# Patient Record
Sex: FEMALE | Race: BLACK OR AFRICAN AMERICAN | Employment: OTHER | URBAN - METROPOLITAN AREA
[De-identification: names, ages, dates, MRNs, and addresses within clinical notes are randomized per-mention and may not be internally consistent; named-entity substitution may affect disease eponyms.]

---

## 2017-12-04 ENCOUNTER — HOSPITAL ENCOUNTER (OUTPATIENT)
Dept: OTHER | Age: 67
Discharge: HOME | End: 2017-12-04
Payer: MEDICARE

## 2017-12-04 DIAGNOSIS — M79.671 PAIN IN RIGHT FOOT: ICD-10-CM

## 2017-12-04 PROCEDURE — 73630 X-RAY EXAM OF FOOT: CPT

## 2019-04-29 ENCOUNTER — HOSPITAL ENCOUNTER (OUTPATIENT)
Dept: PHYSICAL THERAPY | Age: 69
End: 2019-04-29

## 2019-05-06 ENCOUNTER — HOSPITAL ENCOUNTER (OUTPATIENT)
Dept: PHYSICAL THERAPY | Age: 69
Discharge: HOME | End: 2019-05-06
Payer: MEDICARE

## 2019-05-06 PROCEDURE — 97110 THERAPEUTIC EXERCISES: CPT

## 2019-05-06 PROCEDURE — 97530 THERAPEUTIC ACTIVITIES: CPT

## 2019-05-06 PROCEDURE — 97162 PT EVAL MOD COMPLEX 30 MIN: CPT

## 2019-05-06 NOTE — PROGRESS NOTES
[facility] [place] [address] [address] [facility] [address]  Shlomo stokes, [address]  [phone] fax    Plan of Care/ Statement of Necessity for Physical Therapy Services    Patient name: Jenniffer Tam Start of Care: 2019   Referral source: Yaquelin Arroyo MD : 1950    Medical Diagnosis: Left foot pain [M79.672]  Payor: BLUE CROSS / Plan: [facility] PPO / Product Type: PPO /  Onset Date:2019    Treatment Diagnosis: Left plantar fasciitis   Prior Hospitalization: see medical history Provider#: [id number]   Medications: Verified on Patient summary List    Comorbidities: None   Prior Level of Function: The patient was able to ambulate without difficulty. The Plan of Care and following information is based on the information from the initial evaluation. Assessment/ key information: The patient is a 79year old female with a chief complaint of left foot pain that began to be painful in early January. She indicates that her pain has not improved, and that she feels this first thing in the morning or if she has had a time where she has been immobilized for a period of time. She denies radiographs or cortisone injection and is point tender through her plantar fascia. The patient has signs and symptoms that are consistent with plantar fasciitis with associated impairments consisting of pain, decreased ROM, poor flexibility, decreased first MP extension, decreased ease of ambulation and limited quality of life. The patient will benefit from skilled PT in order to address the aforementioned impairments.     Evaluation Complexity History LOW Complexity : Zero comorbidities / personal factors that will impact the outcome / POC; Examination MEDIUM Complexity : 3 Standardized tests and measures addressing body structure, function, activity limitation and / or participation in recreation  ;Presentation LOW Complexity : Stable, uncomplicated  ;Clinical Decision Making MEDIUM Complexity : FOTO score of 26-74  Overall Complexity Rating: MEDIUM  Problem List: pain affecting function, decrease ROM, decrease strength, decrease ADL/ functional abilitiies, decrease activity tolerance and decrease flexibility/ joint mobility   Treatment Plan may include any combination of the following: Therapeutic exercise, Therapeutic activities, Neuromuscular re-education, Physical agent/modality, Manual therapy, Patient education and Functional mobility training  Patient / Family readiness to learn indicated by: asking questions, trying to perform skills and interest  Persons(s) to be included in education: patient (P)  Barriers to Learning/Limitations: None  Patient Goal (s): No pain  Patient Self Reported Health Status: good  Rehabilitation Potential: good     Short term goals to be accomplished within 2 weeks:  1. The patient will demonstrate independence and compliance with HEP to maximize therapeutic benefit. 2. The patient will improve left ankle DF passively to 15 to improve ease of ambulation. Long term goals to be accomplished within 4 weeks:  1. The patient will improve FOTO score to 58 to maximize quality of life. 2. The patient will improve left DF ankle ROM actively to 12 to improve ease of descending stairs with greater efficiency. 3. The patient will demonstrate left inversion strength of 5/5 MMT to improve efficiency of arch preservation during stance phase and maximize ambulation efficiency. 4. The patient will demonstrate single leg stance stance for 30 seconds to improve stability in stance for return to PLOF. Frequency / Duration: Patient to be seen 2 times per week for 4 weeks.     Medicare Certification: 7/88/3522 - 7/06/2019    Patient/ Caregiver education and instruction: Diagnosis, prognosis, self care, activity modification and exercises   [x]  Plan of care has been reviewed with HALEY Mercado, PT 5/6/2019 5:16 PM    ________________________________________________________________________    I certify that the above Therapy Services are being furnished while the patient is under my care. I agree with the treatment plan and certify that this therapy is necessary.     Physician's Signature:____________Date:_________TIME:________    ** Signature, Date and Time must be completed for valid certification **    Please sign and return to [facility] [address] [place] [address] & [facility]  [address] [place] [address] [address]  [phone] fax

## 2019-05-06 NOTE — PROGRESS NOTES
PT DAILY TREATMENT NOTE 10-18    Patient Name: Christopher Sharma  Date:2019  : 1950  [x]  Patient  Verified  Payor: BLUE CROSS / Plan: [facility] PPO / Product Type: PPO /    In time:4:33  Out time:5:15  Total Treatment Time (min): 42  Visit #: 1 of 8    Medicare/BCBS Only   Total Timed Codes (min):  24 1:1 Treatment Time: 24       Treatment Area: Left foot pain [M79.672]    SUBJECTIVE  Pain Level (0-10 scale): 4/10  Any medication changes, allergies to medications, adverse drug reactions, diagnosis change, or new procedure performed?: [x] No    [] Yes (see summary sheet for update)  Subjective functional status/changes:   [] No changes reported  The patient states that she has a chief complaint of left foot pain limiting her ability to walk and stand after she has been sitting. OBJECTIVE  18 min [x]Eval                  []Re-Eval       14 min Therapeutic Exercise:  [x] See flow sheet :   Rationale: increase ROM and increase strength to improve the patients ability to improve ADL ease. 10 min Therapeutic Activity:  [x]  See flow sheet : explanation of biomechanics of gait with related anatomy as well as stretching implications. Rationale: increase ROM and increase strength  to improve the patients ability to improve ADL ease. With   [] TE   [] TA   [] neuro   [] other: Patient Education: [x] Review HEP    [] Progressed/Changed HEP based on:   [] positioning   [] body mechanics   [] transfers   [] heat/ice application    [] other:      Other Objective/Functional Measures: See IE     Pain Level (0-10 scale) post treatment: 4/10    ASSESSMENT/Changes in Function: See POC.     Patient will continue to benefit from skilled PT services to modify and progress therapeutic interventions, address functional mobility deficits, address ROM deficits, address strength deficits, analyze and address soft tissue restrictions, analyze and cue movement patterns, analyze and modify body mechanics/ergonomics, assess and modify postural abnormalities and instruct in home and community integration to attain remaining goals. [x]  See Plan of Care  []  See progress note/recertification  []  See Discharge Summary         Progress towards goals / Updated goals:  Short term goals to be accomplished within 2 weeks:  1. The patient will demonstrate independence and compliance with HEP to maximize therapeutic benefit. 2. The patient will improve left ankle DF passively to 15 to improve ease of ambulation. Long term goals to be accomplished within 4 weeks:  1. The patient will improve FOTO score to 58 to maximize quality of life. 2. The patient will improve left DF ankle ROM actively to 12 to improve ease of descending stairs with greater efficiency. 3. The patient will demonstrate left inversion strength of 5/5 MMT to improve efficiency of arch preservation during stance phase and maximize ambulation efficiency.   4. The patient will demonstrate single leg stance stance for 30 seconds to improve stability in stance for return to PLOF.      PLAN  []  Upgrade activities as tolerated     [x]  Continue plan of care  []  Update interventions per flow sheet       []  Discharge due to:_  []  Other:_      Bobby Beaver, PT 5/6/2019  5:21 PM    Future Appointments   Date Time Provider Daryl Tanner   5/8/2019 10:00 AM Kathryn Story, PT [facility]   5/14/2019  5:00 PM Yeison Richmond, PTA [facility]   5/21/2019  4:30 PM Verla Stagers, PT [facility]   5/24/2019 12:00 PM Verla Stagers, PT [facility]   5/28/2019  6:00 PM Yeison Richmond, PTA [facility]   5/30/2019  5:00 PM Verla Stagers, PT [facility]   6/3/2019  4:30 PM Yeison Richmond, PTA [facility]

## 2019-05-08 ENCOUNTER — HOSPITAL ENCOUNTER (OUTPATIENT)
Dept: PHYSICAL THERAPY | Age: 69
Discharge: HOME | End: 2019-05-08
Payer: MEDICARE

## 2019-05-08 PROCEDURE — 97110 THERAPEUTIC EXERCISES: CPT

## 2019-05-08 NOTE — PROGRESS NOTES
PT DAILY TREATMENT NOTE 10-18    Patient Name: Kanwal Mcwilliams  Date:2019  : 1950  [x]  Patient  Verified  Payor: BLUE CROSS / Plan: [facility] PPO / Product Type: PPO /    In time:10:03  Out time:10:35  Total Treatment Time (min): 32  Visit #: 2 of 8    Medicare/BCBS Only   Total Timed Codes (min):  32 1:1 Treatment Time:  12       Treatment Area: Left foot pain [M79.672]    SUBJECTIVE  Pain Level (0-10 scale): 10  Any medication changes, allergies to medications, adverse drug reactions, diagnosis change, or new procedure performed?: [x] No    [] Yes (see summary sheet for update)  Subjective functional status/changes:   [] No changes reported  The patient states she has been performing her HEP and her pain levels are less today. She states she feels like it is helping. OBJECTIVE  24 min Therapeutic Exercise:  [] See flow sheet :   Rationale: increase ROM and increase strength to improve the patients ability to improve ADL ease. 8 min Manual Therapy:  Graston Technique Treatment Intervention:    Patient positioning: prone    Treatment location: left gastroc/soleus/achilles/plantar fascia    Graston Technique Instruments utilized: GT4, GT5    Explained effects and benefits of Graston Technique, including potential for post treatment soreness and bruising. Patient was provided the opportunity to ask any questions and verbalized understanding. Patient wished to proceed with treatment. Rationale: decrease pain, increase ROM and increase tissue extensibility to improve ADL ease. With   [] TE   [] TA   [] neuro   [] other: Patient Education: [x] Review HEP    [] Progressed/Changed HEP based on:   [] positioning   [] body mechanics   [] transfers   [] heat/ice application    [] other:      Other Objective/Functional Measures:   First follow-up.      Pain Level (0-10 scale) post treatment: 1/10    ASSESSMENT/Changes in Function: Good pain control following first session. HEP compliance reported. Patient will continue to benefit from skilled PT services to modify and progress therapeutic interventions, address functional mobility deficits, address ROM deficits, address strength deficits, analyze and address soft tissue restrictions, analyze and cue movement patterns, analyze and modify body mechanics/ergonomics, assess and modify postural abnormalities and instruct in home and community integration to attain remaining goals. []  See Plan of Care  []  See progress note/recertification  []  See Discharge Summary         Progress towards goals / Updated goals:  Short term goals to be accomplished within 2 weeks:  1. The patient will demonstrate independence and compliance with HEP to maximize therapeutic benefit. Met 5/08/2019  2. The patient will improve left ankle DF passively to 15 to improve ease of ambulation. Long term goals to be accomplished within 4 weeks:  1. The patient will improve FOTO score to 58 to maximize quality of life. 2. The patient will improve left DF ankle ROM actively to 12 to improve ease of descending stairs with greater efficiency. 3. The patient will demonstrate left inversion strength of 5/5 MMT to improve efficiency of arch preservation during stance phase and maximize ambulation efficiency.   4. The patient will demonstrate single leg stance stance     PLAN  [x]  Upgrade activities as tolerated     [x]  Continue plan of care  []  Update interventions per flow sheet       []  Discharge due to:_  []  Other:_      Tashi Cornejo, PT 5/8/2019  12:50 PM    Future Appointments   Date Time Provider Daryl Tanner   5/14/2019  5:00 PM [facility]   5/21/2019  4:30 PM Lesly Christie, PT [facility]   5/24/2019 12:00 PM [facility]ly Christie [facility]   5/28/2019  6:00 PM Mak Garcia [facility]   5/30/2019  5:00 PM [facility]ly Christie [facility]   6/3/2019  4:30 PM Mak Garcia C, PTA [facility]

## 2019-05-14 ENCOUNTER — HOSPITAL ENCOUNTER (OUTPATIENT)
Dept: PHYSICAL THERAPY | Age: 69
End: 2019-05-14
Payer: MEDICARE

## 2019-05-21 ENCOUNTER — HOSPITAL ENCOUNTER (OUTPATIENT)
Dept: PHYSICAL THERAPY | Age: 69
Discharge: HOME | End: 2019-05-21
Payer: MEDICARE

## 2019-05-21 PROCEDURE — 97110 THERAPEUTIC EXERCISES: CPT

## 2019-05-21 PROCEDURE — 97140 MANUAL THERAPY 1/> REGIONS: CPT

## 2019-05-21 NOTE — PROGRESS NOTES
PT DAILY TREATMENT NOTE 10-18    Patient Name: Heydi Rothman  Date:2019  : 1950  [x]  Patient  Verified  Payor: [place] MED[place] / Plan: [place] MEDICARE PART A & B / Product Type: Medicare /    In time:4:40  Out time:5:15  Total Treatment Time (min): 35  Visit #: 3 of 8    Medicare/BCBS Only   Total Timed Codes (min):  35 1:1 Treatment Time:  35       Treatment Area: Left foot pain [M79.672]    SUBJECTIVE  Pain Level (0-10 scale): 6/10  Any medication changes, allergies to medications, adverse drug reactions, diagnosis change, or new procedure performed?: [x] No    [] Yes (see summary sheet for update)  Subjective functional status/changes:   [] No changes reported  The patient states that she has been compliant with her HEP, but her pain has increased today from standing all day. OBJECTIVE  25 min Therapeutic Exercise:  [x] See flow sheet :   Rationale: increase ROM and increase strength to improve the patients ability to improve ADL ease. 10 min Manual Therapy:  Graston Technique Treatment Intervention:     Patient positioning: prone     Treatment location: left gastroc/soleus/achilles/plantar fascia     Graston Technique Instruments utilized: GT4, GT5     Explained effects and benefits of Graston Technique, including potential for post treatment soreness and bruising. Patient was provided the opportunity to ask any questions and verbalized understanding. Patient wished to proceed with treatment. Rationale: decrease pain, increase ROM and increase tissue extensibility to improve ADL ease. With   [] TE   [] TA   [] neuro   [] other: Patient Education: [x] Review HEP    [] Progressed/Changed HEP based on:   [] positioning   [] body mechanics   [] transfers   [] heat/ice application    [] other:      Other Objective/Functional Measures:   HEP met  DF 12 degrees     Pain Level (0-10 scale) post treatment: 3/10    ASSESSMENT/Changes in Function: The patient reports compliance with HEP.  Her DF improved to 12 degrees. Patient will continue to benefit from skilled PT services to modify and progress therapeutic interventions, address functional mobility deficits, address ROM deficits, address strength deficits, analyze and address soft tissue restrictions, analyze and cue movement patterns, analyze and modify body mechanics/ergonomics, assess and modify postural abnormalities and instruct in home and community integration to attain remaining goals. []  See Plan of Care  []  See progress note/recertification  []  See Discharge Summary         Progress towards goals / Updated goals:  Short term goals to be accomplished within 2 weeks:  1. The patient will demonstrate independence and compliance with HEP to maximize therapeutic benefit. Met 5/21/2019  2. The patient will improve left ankle DF passively to 15 to improve ease of ambulation. Met - 12 degrees 5/21/2019  Long term goals to be accomplished within 4 weeks:  1. The patient will improve FOTO score to 58 to maximize quality of life. 2. The patient will improve left DF ankle ROM actively to 12 to improve ease of descending stairs with greater efficiency. 3. The patient will demonstrate left inversion strength of 5/5 MMT to improve efficiency of arch preservation during stance phase and maximize ambulation efficiency. 4. The patient will demonstrate single leg stance stance for 30 seconds to improve stability in stance for return to PLOF.     PLAN  []  Upgrade activities as tolerated     [x]  Continue plan of care  []  Update interventions per flow sheet       []  Discharge due to:_  []  Other:_      Verona Colon, PT 5/21/2019  5:01 PM    Future Appointments   Date Time Provider Daryl Tanner   5/28/2019  6:00 PM Fiona Figueredo [facility]   5/30/2019  5:00 PM Jason Gruber, PT [facility]   6/3/2019  4:30 PM Arash Villatoro PTA [facility]

## 2019-05-24 ENCOUNTER — APPOINTMENT (OUTPATIENT)
Dept: PHYSICAL THERAPY | Age: 69
End: 2019-05-24
Payer: MEDICARE

## 2019-05-28 ENCOUNTER — HOSPITAL ENCOUNTER (OUTPATIENT)
Dept: PHYSICAL THERAPY | Age: 69
Discharge: HOME | End: 2019-05-28
Payer: MEDICARE

## 2019-05-28 PROCEDURE — 97140 MANUAL THERAPY 1/> REGIONS: CPT

## 2019-05-28 PROCEDURE — 97110 THERAPEUTIC EXERCISES: CPT

## 2019-05-28 NOTE — PROGRESS NOTES
PT DAILY TREATMENT NOTE 10-18    Patient Name: Hayden Parham  Date:2019  : 1950  [x]  Patient  Verified  Payor: [place] MEDICARE / Plan: [place] MEDICARE PART A & B / Product Type: Medicare /    In time:6:03  Out time:6:34  Total Treatment Time (min): 31  Visit #: 4 of 8    Medicare/BCBS Only   Total Timed Codes (min):  31 1:1 Treatment Time:  31     Treatment Area: Left foot pain [M79.672]     SUBJECTIVE  Pain Level (0-10 scale): 4/10  Any medication changes, allergies to medications, adverse drug reactions, diagnosis change, or new procedure performed?: [x] No    [] Yes (see summary sheet for update)  Subjective functional status/changes:   [] No changes reported  \"I bought some comfortable shoes so my foot isn't that bad right now. \"    OBJECTIVE    23 min Therapeutic Exercise:  [x] See flow sheet :   Rationale: increase ROM, increase strength and increase proprioception to improve the patients ability to perform ADL's.    8 min Manual Therapy:  Graston technique to Left gastroc, soleus, achilles tendon and plantar fascia. Rationale: decrease pain, increase ROM, increase tissue extensibility and decrease trigger points to improve ease of performing functional activities. With   [x] TE   [] TA   [] neuro   [] other: Patient Education: [x] Review HEP    [] Progressed/Changed HEP based on:   [] positioning   [] body mechanics   [] transfers   [] heat/ice application    [] other:      Other Objective/Functional Measures: Increased reps for several exercises. Pain Level (0-10 scale) post treatment: 0/10    ASSESSMENT/Changes in Function: Pt denied pain post-treatment. Extensive education on how to properly return to recreational activities. Pt puts forth good effort, plan to continue PT to increase ankle DF and ankle strength to improve ease of performing functional and recreational activities.     Patient will continue to benefit from skilled PT services to modify and progress therapeutic interventions, address functional mobility deficits, address ROM deficits, address strength deficits, analyze and address soft tissue restrictions and analyze and cue movement patterns to attain remaining goals. [x]  See Plan of Care  []  See progress note/recertification  []  See Discharge Summary         Progress towards goals / Updated goals:  Short term goals to be accomplished within 2 weeks:  1. The patient will demonstrate independence and compliance with HEP to maximize therapeutic benefit. Met 5/21/2019  2. The patient will improve left ankle DF passively to 15 to improve ease of ambulation. Met - 12 degrees 5/21/2019  Long term goals to be accomplished within 4 weeks:  1. The patient will improve FOTO score to 58 to maximize quality of life. 2. The patient will improve left DF ankle ROM actively to 12 to improve ease of descending stairs with greater efficiency. 3. The patient will demonstrate left inversion strength of 5/5 MMT to improve efficiency of arch preservation during stance phase and maximize ambulation efficiency. 4. The patient will demonstrate single leg stance stance for 30 seconds to improve stability in stance for return to PLOF.      PLAN  []  Upgrade activities as tolerated     [x]  Continue plan of care  []  Update interventions per flow sheet       []  Discharge due to:_  []  Other:_      Mario England, HALEY 5/28/2019  6:02 PM    Future Appointments   Date Time Provider Daryl Tanner   5/30/2019  5:00 PM [facility]   6/3/2019  4:30 PM Parminder Villatoro, [facility]

## 2019-05-30 ENCOUNTER — HOSPITAL ENCOUNTER (OUTPATIENT)
Dept: PHYSICAL THERAPY | Age: 69
Discharge: HOME | End: 2019-05-30
Payer: MEDICARE

## 2019-05-30 PROCEDURE — 97110 THERAPEUTIC EXERCISES: CPT

## 2019-05-30 PROCEDURE — 97140 MANUAL THERAPY 1/> REGIONS: CPT

## 2019-05-30 NOTE — PROGRESS NOTES
PT DAILY TREATMENT NOTE 10-18    Patient Name: Colette Reason  Date:2019  : 1950  [x]  Patient  Verified  Payor: [place] MEDICARE / Plan: [place] MEDICARE PART A & B / Product Type: Medicare /    In time:5:52  Out time:6:35  Total Treatment Time (min): 43  Visit #: 5 of 8    Medicare/BCBS Only   Total Timed Codes (min):  43 1:1 Treatment Time:  43       Treatment Area: Left foot pain [M79.672]    SUBJECTIVE  Pain Level (0-10 scale): 2/10  Any medication changes, allergies to medications, adverse drug reactions, diagnosis change, or new procedure performed?: [x] No    [] Yes (see summary sheet for update)  Subjective functional status/changes:   [] No changes reported  The patient states that her foot feels about 50% better since [facility]. OBJECTIVE  33 min Therapeutic Exercise:  [x] See flow sheet :   Rationale: increase ROM and increase strength to improve the patients ability to improve ADL ease. 10 min Manual Therapy:  Graston Technique Treatment Intervention:    Patient positioning: prone    Treatment location: left gastroc/soleus/achilles/plantar fascia    Graston Technique Instruments utilized: GT4, GT5    Explained effects and benefits of Graston Technique, including potential for post treatment soreness and bruising. Patient was provided the opportunity to ask any questions and verbalized understanding. Patient wished to proceed with treatment. Rationale: decrease pain, increase ROM and increase tissue extensibility to improve ADL ease. With   [] TE   [] TA   [] neuro   [] other: Patient Education: [x] Review HEP    [] Progressed/Changed HEP based on:   [] positioning   [] body mechanics   [] transfers   [] heat/ice application    [] other:      Other Objective/Functional Measures:   MP extension: 51 degrees  DF: 13 degrees    Pain Level (0-10 scale) post treatment: 0/10    ASSESSMENT/Changes in Function: The patient is progressing with both DF and first ray MP extension.  She reports 50% improvement since [facility]. She left the clinic with no pain and in no apparent distress. Patient will continue to benefit from skilled PT services to modify and progress therapeutic interventions, address functional mobility deficits, address ROM deficits, address strength deficits, analyze and address soft tissue restrictions, analyze and cue movement patterns, analyze and modify body mechanics/ergonomics, assess and modify postural abnormalities and instruct in home and community integration to attain remaining goals. []  See Plan of Care  []  See progress note/recertification  []  See Discharge Summary         Progress towards goals / Updated goals:  Short term goals to be accomplished within 2 weeks:  1. The patient will demonstrate independence and compliance with HEP to maximize therapeutic benefit. Met 5/21/2019  2. The patient will improve left ankle DF passively to 15 to improve ease of ambulation. Met - 12 degrees 5/21/2019  Long term goals to be accomplished within 4 weeks:  1. The patient will improve FOTO score to 58 to maximize quality of life. 2. The patient will improve left DF ankle ROM actively to 12 to improve ease of descending stairs with greater efficiency. Met - 13 degrees 5/30/2019  3. The patient will demonstrate left inversion strength of 5/5 MMT to improve efficiency of arch preservation during stance phase and maximize ambulation efficiency. 4. The patient will demonstrate single leg stance stance for 30 seconds to improve stability in stance for return to PLOF.        PLAN  []  Upgrade activities as tolerated     [x]  Continue plan of care  []  Update interventions per flow sheet       []  Discharge due to:_  []  Other:_      Cat Salazar, PT 5/30/2019  6:51 PM    Future Appointments   Date Time Provider Daryl Tanner   6/3/2019  4:30 PM Aminah Anton, PTA [facility]

## 2019-06-03 ENCOUNTER — APPOINTMENT (OUTPATIENT)
Dept: PHYSICAL THERAPY | Age: 69
End: 2019-06-03
Payer: MEDICARE

## 2019-06-18 ENCOUNTER — APPOINTMENT (OUTPATIENT)
Dept: PHYSICAL THERAPY | Age: 69
End: 2019-06-18
Payer: MEDICARE

## 2019-06-24 ENCOUNTER — HOSPITAL ENCOUNTER (OUTPATIENT)
Dept: PHYSICAL THERAPY | Age: 69
Discharge: HOME | End: 2019-06-24
Payer: MEDICARE

## 2019-06-24 PROCEDURE — 97140 MANUAL THERAPY 1/> REGIONS: CPT

## 2019-06-24 PROCEDURE — 97110 THERAPEUTIC EXERCISES: CPT

## 2019-06-24 NOTE — PROGRESS NOTES
PT DAILY TREATMENT NOTE 10-18    Patient Name: Fern Claude  Date:2019  : 1950  [x]  Patient  Verified  Payor: BLUE CROSS / Plan: [facility] PPO / Product Type: PPO /    In time:9:30  Out time:10:10  Total Treatment Time (min): 40  Visit #: 6 of 8    Medicare/BCBS Only   Total Timed Codes (min):  40 1:1 Treatment Time:  40       Treatment Area: Left foot pain [M79.672]    SUBJECTIVE  Pain Level (0-10 scale): 8/10  Any medication changes, allergies to medications, adverse drug reactions, diagnosis change, or new procedure performed?: [x] No    [] Yes (see summary sheet for update)  Subjective functional status/changes:   [] No changes reported  \"I don't know why it's not healed yet. I need a Doctors note because I see Doctor on Wednesday. \"    OBJECTIVE    32 min Therapeutic Exercise:  [x] See flow sheet :   Rationale: increase ROM and increase strength to improve the patients ability to perform ADL's.    8 min Manual Therapy:  Graston technique to Left gastroc, soleus, achilles tendon and plantar fascia. Gastroc and soleus stretch. Rationale: decrease pain, increase ROM, increase tissue extensibility and decrease trigger points to improve standing/walking tolerance. With   [x] TE   [] TA   [] neuro   [] other: Patient Education: [x] Review HEP    [] Progressed/Changed HEP based on:   [] positioning   [] body mechanics   [] transfers   [] heat/ice application    [] other:      Other Objective/Functional Measures: FOTO 32%. MMT Left Inversion 4+/5. Left SLS 5\". Pt reports continued HEP compliance. Over 3 weeks passed since previous visit. Recommend continue PT for additional 2x a week for 3-4 weeks.     Pain Level (0-10 scale) post treatment: 0/10    ASSESSMENT/Changes in Function:      []  See Plan of Care  [x]  See progress note/recertification  []  See Discharge Summary         Progress towards goals / Updated goals:  Short term goals to be accomplished within 2 weeks: 1. The patient will demonstrate independence and compliance with HEP to maximize therapeutic benefit. Met 5/21/2019   2. The patient will improve left ankle DF passively to 15 to improve ease of ambulation. Met - 12 degrees 5/21/2019  Long term goals to be accomplished within 4 weeks:  1. The patient will improve FOTO score to 58 to maximize quality of life. - FOTO 32%. 6/24/2019  2. The patient will improve left DF ankle ROM actively to 12 to improve ease of descending stairs with greater efficiency. Met - 13 degrees 5/30/2019  3. The patient will demonstrate left inversion strength of 5/5 MMT to improve efficiency of arch preservation during stance phase and maximize ambulation efficiency. - MMT Left Inversion 4+/5. 6/24/2019  4.  The patient will demonstrate single leg stance stance for 30 seconds to improve stability in stance for return to PLOF.  - Left SLS 5\". 6/24/2019    PLAN  []  Upgrade activities as tolerated     [x]  Continue plan of care  []  Update interventions per flow sheet       []  Discharge due to:_  []  Other:_      Lyn Nunez PTA 6/24/2019  9:31 AM    Future Appointments   Date Time Provider Daryl Tanner   6/28/2019  2:00 PM Geno Blank, PT [facility]

## 2019-07-02 ENCOUNTER — HOSPITAL ENCOUNTER (OUTPATIENT)
Dept: PHYSICAL THERAPY | Age: 69
Discharge: HOME | End: 2019-07-02
Payer: MEDICARE

## 2019-07-02 PROCEDURE — 97110 THERAPEUTIC EXERCISES: CPT

## 2019-07-02 PROCEDURE — 97140 MANUAL THERAPY 1/> REGIONS: CPT

## 2019-07-02 NOTE — PROGRESS NOTES
PT DAILY TREATMENT NOTE 10-18    Patient Name: Juanjo Jett  Date:2019  : 1950  [x]  Patient  Verified  Payor: [place] MEDICARE / Plan: [place] MEDICARE PART A & B / Product Type: Medicare /    In time:12:02  Out time:12:33  Total Treatment Time (min): 31  Visit #: 7 of 8    Medicare/BCBS Only   Total Timed Codes (min):  31 1:1 Treatment Time:  31       Treatment Area: Left foot pain [M79.672]    SUBJECTIVE  Pain Level (0-10 scale): 2/10  Any medication changes, allergies to medications, adverse drug reactions, diagnosis change, or new procedure performed?: [x] No    [] Yes (see summary sheet for update)  Subjective functional status/changes:   [] No changes reported  The patient states that her foot has been feeling better. OBJECTIVE  23 min Therapeutic Exercise:  [] See flow sheet :   Rationale: increase ROM and increase strength to improve the patients ability to improve ADL ease. 8 min Manual Therapy:  Graston Technique Treatment Intervention:    Patient positioning: prone    Treatment location: right gastroc/soleus/plantar fascia    Graston Technique Instruments utilized: GT-5, GT-4    Explained effects and benefits of Graston Technique, including potential for post treatment soreness and bruising. Patient was provided the opportunity to ask any questions and verbalized understanding. Patient wished to proceed with treatment. Rationale: decrease pain, increase ROM and increase tissue extensibility to improve ADL ease. With   [] TE   [] TA   [] neuro   [] other: Patient Education: [x] Review HEP    [] Progressed/Changed HEP based on:   [] positioning   [] body mechanics   [] transfers   [] heat/ice application    [] other:      Other Objective/Functional Measures:   DF ROM continues to be limited grossly. Decreased pain through plantar fascia noted during graston. Medial gastroc head discomfort.     Pain Level (0-10 scale) post treatment: 0/10    ASSESSMENT/Changes in Function: Decrease in pain from last session appreciated today with good pain control post session. Continue POC with emphasis of maximize intrinsic foot strength and talocrural ROM. Patient will continue to benefit from skilled PT services to modify and progress therapeutic interventions, address functional mobility deficits, address ROM deficits, address strength deficits, analyze and address soft tissue restrictions, analyze and cue movement patterns, analyze and modify body mechanics/ergonomics, assess and modify postural abnormalities and instruct in home and community integration to attain remaining goals. []  See Plan of Care  []  See progress note/recertification  []  See Discharge Summary         Progress towards goals / Updated goals:  1. The patient will improve FOTO score to 58 to maximize quality of life. - FOTO 32%. 6/24/2019  2. The patient will improve left DF ankle ROM actively to 15 to improve ease of descending stairs with greater efficiency.   3. The patient will demonstrate left inversion strength of 5/5 MMT to improve efficiency of arch preservation during stance phase and maximize ambulation efficiency. - MMT Left Inversion 4+/5. 6/24/2019  4.  The patient will demonstrate single leg stance stance for 30 seconds to improve stability in stance for return to PLOF.  - Left SLS 5\". 6/24/2019    PLAN  []  Upgrade activities as tolerated     [x]  Continue plan of care  []  Update interventions per flow sheet       []  Discharge due to:_  []  Other:_      Harshal Adams, PT 7/2/2019  3:35 PM    Future Appointments   Date Time Provider Daryl Tanner   7/9/2019 10:00 AM Christian Peace PTA [facility]   7/11/2019 10:00 AM Christian Peace PTA [facility]   7/15/2019 10:00 AM Sherwin uTttle PT [facility]   7/23/2019  9:30 AM [facility]   7/25/2019 10:00 AM [facility] Peace [facility]   7/30/2019 10:00 AM Macario Story, PT [facility]

## 2019-07-09 ENCOUNTER — HOSPITAL ENCOUNTER (OUTPATIENT)
Dept: PHYSICAL THERAPY | Age: 69
Discharge: HOME | End: 2019-07-09
Payer: MEDICARE

## 2019-07-09 PROCEDURE — 97140 MANUAL THERAPY 1/> REGIONS: CPT

## 2019-07-09 PROCEDURE — 97110 THERAPEUTIC EXERCISES: CPT

## 2019-07-09 NOTE — PROGRESS NOTES
PT DAILY TREATMENT NOTE 10-18    Patient Name: Joana Blanchard  Date:2019  : 1950  [x]  Patient  Verified  Payor: VA MEDICARE / Plan: VA MEDICARE PART A & B / Product Type: Medicare /    In time:10:09  Out time:10:35  Total Treatment Time (min): 26  Visit #: 2 of 8    Medicare/BCBS Only   Total Timed Codes (min):  26 1:1 Treatment Time:  26       Treatment Area: Left foot pain [M79.672]    SUBJECTIVE  Pain Level (0-10 scale): 0/10  Any medication changes, allergies to medications, adverse drug reactions, diagnosis change, or new procedure performed?: [x] No    [] Yes (see summary sheet for update)  Subjective functional status/changes:   [] No changes reported  Pt late for appointment. OBJECTIVE    18 min Therapeutic Exercise:  [x] See flow sheet :   Rationale: increase ROM, increase strength and increase proprioception to improve the patients ability to perform ADL's.    8 min Manual Therapy:  Graston technique to Left grastroc, soleus, achilles tendon and plantar fascia. Rationale: decrease pain, increase ROM and increase tissue extensibility to improve ease of performing functional activities. With   [x] TE   [] TA   [] neuro   [] other: Patient Education: [x] Review HEP    [] Progressed/Changed HEP based on:   [] positioning   [] body mechanics   [] transfers   [] heat/ice application    [] other:      Other Objective/Functional Measures: Added eccentric 2\" step downs 15 reps. Increased bridges to 15 reps. Pain Level (0-10 scale) post treatment: 0/10    ASSESSMENT/Changes in Function: Demonstrates improved ankle control with short foot sit to stands. Continue PT to increase ankle strength and intrinsic mm stability to improve ease of performing functional activities.     Patient will continue to benefit from skilled PT services to modify and progress therapeutic interventions, address functional mobility deficits, address ROM deficits, address strength deficits and analyze and address soft tissue restrictions to attain remaining goals. [x]  See Plan of Care  []  See progress note/recertification  []  See Discharge Summary         Progress towards goals / Updated goals:  1. The patient will improve FOTO score to 58 to maximize quality of life. - FOTO 32%. 6/24/2019  2. The patient will improve left DF ankle ROM actively to 15 to improve ease of descending stairs with greater efficiency.   3. The patient will demonstrate left inversion strength of 5/5 MMT to improve efficiency of arch preservation during stance phase and maximize ambulation efficiency. - MMT Left Inversion 4+/5. 6/24/2019  4.  The patient will demonstrate single leg stance stance for 30 seconds to improve stability in stance for return to PLOF.  - Left SLS 5\". 6/24/2019    PLAN  []  Upgrade activities as tolerated     [x]  Continue plan of care  []  Update interventions per flow sheet       []  Discharge due to:_  []  Other:_      Yanelis Morel PTA 7/9/2019  10:12 AM    Future Appointments   Date Time Provider Daryl Tanner   7/11/2019 10:00 AM Angeline Robledo PTA [facility]   7/15/2019 10:00 AM [facility]   7/23/2019  9:30 AM [facility]   7/25/2019 10:00 AM Angeline [facility]led[facility]   7/30/2019 10:00 AM [facility] Story[facility]

## 2019-07-11 ENCOUNTER — HOSPITAL ENCOUNTER (OUTPATIENT)
Dept: PHYSICAL THERAPY | Age: 69
Discharge: HOME | End: 2019-07-11
Payer: MEDICARE

## 2019-07-11 PROCEDURE — 97110 THERAPEUTIC EXERCISES: CPT

## 2019-07-11 PROCEDURE — 97140 MANUAL THERAPY 1/> REGIONS: CPT

## 2019-07-11 NOTE — PROGRESS NOTES
PT DAILY TREATMENT NOTE 10-18    Patient Name: Marquis Garcia  Date:2019  : 1950  [x]  Patient  Verified  Payor: VA MEDICARE / Plan: VA MEDICARE PART A & B / Product Type: Medicare /    In time:10:07  Out time:10:37  Total Treatment Time (min): 30  Visit #: 3 of 8    Medicare/BCBS Only   Total Timed Codes (min):  30 1:1 Treatment Time:  30       Treatment Area: Left foot pain [M79.672]    SUBJECTIVE  Pain Level (0-10 scale): 1-10  Any medication changes, allergies to medications, adverse drug reactions, diagnosis change, or new procedure performed?: [x] No    [] Yes (see summary sheet for update)  Subjective functional status/changes:   [] No changes reported  \"I've been resting it, just a little pain in the heel. \"    OBJECTIVE    22 min Therapeutic Exercise:  [x] See flow sheet :   Rationale: increase ROM, increase strength and increase proprioception to improve the patients ability to perform functional activities. 8 min Manual Therapy:  Graston technique to Left gastroc, soleus, achilles tendon and plantar fascia. Rationale: decrease pain, increase ROM, increase tissue extensibility and decrease trigger points to improve walking/standing tolerance. With   [x] TE   [] TA   [] neuro   [] other: Patient Education: [x] Review HEP    [] Progressed/Changed HEP based on:   [] positioning   [] body mechanics   [] transfers   [] heat/ice application    [] other:      Other Objective/Functional Measures: AROM Left ankle DF 9 degrees. Pain Level (0-10 scale) post treatment: 2/10    ASSESSMENT/Changes in Function: Ankle DF improved by 1 degrees. Pt making steady progress towards goals. Continue PT to increase ankle DF AROM and improved walking/standing tolerance.     Patient will continue to benefit from skilled PT services to modify and progress therapeutic interventions, address functional mobility deficits, address ROM deficits, address strength deficits and analyze and address soft tissue restrictions to attain remaining goals. [x]  See Plan of Care  []  See progress note/recertification  []  See Discharge Summary         Progress towards goals / Updated goals:  1. The patient will improve FOTO score to 58 to maximize quality of life. - FOTO 32%. 6/24/2019  2. The patient will improve left DF ankle ROM actively to 15 to improve ease of descending stairs with greater efficiency. - AROM Left ankle DF 9 degrees. 7/11/2019  3. The patient will demonstrate left inversion strength of 5/5 MMT to improve efficiency of arch preservation during stance phase and maximize ambulation efficiency. - MMT Left Inversion 4+/5. 6/24/2019  4.  The patient will demonstrate single leg stance stance for 30 seconds to improve stability in stance for return to PLOF.  - Left SLS 5\". 6/24/2019    PLAN  []  Upgrade activities as tolerated     [x]  Continue plan of care  []  Update interventions per flow sheet       []  Discharge due to:_  []  Other:_      Michelle Magana PTA 7/11/2019  10:09 AM    Future Appointments   Date Time Provider Daryl Tanner   7/15/2019 10:00 AM Compton, Margaree Cooks, PT [facility]   7/23/2019  9:30 AM [facility]   7/25/2019 10:00 AM [facility]   7/30/2019 10:00 AM Compton[facility] Cooks[facility]

## 2019-07-15 ENCOUNTER — HOSPITAL ENCOUNTER (OUTPATIENT)
Dept: PHYSICAL THERAPY | Age: 69
Discharge: HOME | End: 2019-07-15
Payer: MEDICARE

## 2019-07-15 PROCEDURE — 97140 MANUAL THERAPY 1/> REGIONS: CPT

## 2019-07-15 PROCEDURE — 97110 THERAPEUTIC EXERCISES: CPT

## 2019-07-15 NOTE — PROGRESS NOTES
PT DAILY TREATMENT NOTE 10-18    Patient Name: Rafa Al  Date:7/15/2019  : 1950  [x]  Patient  Verified  Payor: [place] MEDICARE / Plan: VA MEDICARE PART A & B / Product Type: Medicare /    In time:9:59  Out time:10:36  Total Treatment Time (min): 37  Visit #: 4 of 8    Medicare/BCBS Only   Total Timed Codes (min):  37 1:1 Treatment Time:  31       Treatment Area: Left foot pain [M79.672]    SUBJECTIVE  Pain Level (0-10 scale): 10  Any medication changes, allergies to medications, adverse drug reactions, diagnosis change, or new procedure performed?: [x] No    [] Yes (see summary sheet for update)  Subjective functional status/changes:   [] No changes reported  The patient states that her foot is sore from being on it car shopping, but overall doing better, and feeling a ton better since onset date. OBJECTIVE  27 min Therapeutic Exercise:  [x] See flow sheet :   Rationale: increase ROM and increase strength to improve the patients ability to improve ADL ease. 10 min Neuromuscular Re-education:  [x]  See flow sheet :   Rationale: increase ROM and increase strength  to improve the patients ability to improve ADL ease. With   [] TE   [] TA   [] neuro   [] other: Patient Education: [x] Review HEP    [] Progressed/Changed HEP based on:   [] positioning   [] body mechanics   [] transfers   [] heat/ice application    [] other:      Other Objective/Functional Measures: The patient is ambulating with improved ease, noting decreased pain with ambulation. Pain Level (0-10 scale) post treatment: 1/10    ASSESSMENT/Changes in Function: The patient has progressed well with regards to ambulation ease. No increase in pain post session.      Patient will continue to benefit from skilled PT services to modify and progress therapeutic interventions, address functional mobility deficits, address ROM deficits, address strength deficits, analyze and address soft tissue restrictions, analyze and cue movement patterns, analyze and modify body mechanics/ergonomics, assess and modify postural abnormalities and instruct in home and community integration to attain remaining goals. []  See Plan of Care  []  See progress note/recertification  []  See Discharge Summary         Progress towards goals / Updated goals:  1. The patient will improve FOTO score to 58 to maximize quality of life. - FOTO 32%. 6/24/2019  2. The patient will improve left DF ankle ROM actively to 15 to improve ease of descending stairs with greater efficiency. - AROM Left ankle DF 9 degrees. 7/11/2019  3. The patient will demonstrate left inversion strength of 5/5 MMT to improve efficiency of arch preservation during stance phase and maximize ambulation efficiency. - MMT Left Inversion 4+/5. 6/24/2019  4.  The patient will demonstrate single leg stance stance for 30 seconds to improve stability in stance for return to PLOF.  - Left SLS 5\" - 6/24/2019     PLAN  []  Upgrade activities as tolerated     [x]  Continue plan of care  []  Update interventions per flow sheet       []  Discharge due to:_  []  Other:_      Yeison Ramos, PT 7/15/2019  1:03 PM    Future Appointments   Date Time Provider Daryl Tanner   7/23/2019  9:30 AM Linda Rogers PTA [facility]   7/25/2019 10:00 AM [facility]   7/30/2019 10:00 AM Donta Story, PT [facility]

## 2019-07-23 ENCOUNTER — APPOINTMENT (OUTPATIENT)
Dept: PHYSICAL THERAPY | Age: 69
End: 2019-07-23
Payer: MEDICARE

## 2019-07-24 ENCOUNTER — HOSPITAL ENCOUNTER (OUTPATIENT)
Dept: PHYSICAL THERAPY | Age: 69
Discharge: HOME | End: 2019-07-24
Payer: MEDICARE

## 2019-07-24 PROCEDURE — 97140 MANUAL THERAPY 1/> REGIONS: CPT

## 2019-07-24 PROCEDURE — 97110 THERAPEUTIC EXERCISES: CPT

## 2019-07-24 NOTE — PROGRESS NOTES
PT DAILY TREATMENT NOTE 10-18    Patient Name: Josy Brought  Date:2019  : 1950  [x]  Patient  Verified  Payor: VA MEDICARE / Plan: VA MEDICARE PART A & B / Product Type: Medicare /    In time:4:30  Out time:4:59  Total Treatment Time (min): 29  Visit #: 5 of 8    Medicare/BCBS Only   Total Timed Codes (min):  29 1:1 Treatment Time:  26       Treatment Area: Left foot pain [M79.672]    SUBJECTIVE  Pain Level (0-10 scale): 1/10  Any medication changes, allergies to medications, adverse drug reactions, diagnosis change, or new procedure performed?: [x] No    [] Yes (see summary sheet for update)  Subjective functional status/changes:   [] No changes reported  \"Just a little pain right now. \"    OBJECTIVE    21 min Therapeutic Exercise:  [x] See flow sheet :   Rationale: increase ROM, increase strength and increase proprioception to improve the patients ability to perform ADL's.     8 min Manual Therapy:  Graston technique to left gastroc, soleus, achilles tendon and plantar fascia. Rationale: decrease pain, increase ROM, increase tissue extensibility and decrease trigger points to improve standing/walking tolerance. With   [x] TE   [] TA   [] neuro   [] other: Patient Education: [x] Review HEP    [] Progressed/Changed HEP based on:   [] positioning   [] body mechanics   [] transfers   [] heat/ice application    [] other:      Other Objective/Functional Measures: MMT Left ankle inversion 4+/5. Pain Level (0-10 scale) post treatment: 0/10    ASSESSMENT/Changes in Function: Pt making progress towards goals. Reports limited pain with walking/standing the last few days. Continue PT to further improve ease of performing functional activities.     Patient will continue to benefit from skilled PT services to modify and progress therapeutic interventions, address functional mobility deficits, address ROM deficits, address strength deficits, analyze and address soft tissue restrictions and analyze and modify body mechanics/ergonomics to attain remaining goals. [x]  See Plan of Care  []  See progress note/recertification  []  See Discharge Summary         Progress towards goals / Updated goals:  1. The patient will improve FOTO score to 58 to maximize quality of life. - FOTO 32%. 6/24/2019  2. The patient will improve left DF ankle ROM actively to 15 to improve ease of descending stairs with greater efficiency. - AROM Left ankle DF 9 degrees. 7/11/2019  3. The patient will demonstrate left inversion strength of 5/5 MMT to improve efficiency of arch preservation during stance phase and maximize ambulation efficiency. - MMT Left Inversion 4+/5. 6/24/2019; 4+/5. 7/24/2019  4.  The patient will demonstrate single leg stance stance for 30 seconds to improve stability in stance for return to PLOF.  - Left SLS 5\" - 6/24/2019    PLAN  []  Upgrade activities as tolerated     [x]  Continue plan of care  []  Update interventions per flow sheet       []  Discharge due to:_  []  Other:_      Linnette Nazario PTA 7/24/2019  4:26 PM    Future Appointments   Date Time Provider Daryl Tanner   7/24/2019  4:30 PM Miriam White PTA [facility]   7/25/2019 10:00 AM Miriam White PTA [facility]   7/30/2019 10:00 AM German Story, PT [facility]

## 2019-07-25 ENCOUNTER — HOSPITAL ENCOUNTER (OUTPATIENT)
Dept: PHYSICAL THERAPY | Age: 69
Discharge: HOME | End: 2019-07-25
Payer: MEDICARE

## 2019-07-25 PROCEDURE — 97140 MANUAL THERAPY 1/> REGIONS: CPT

## 2019-07-25 PROCEDURE — 97110 THERAPEUTIC EXERCISES: CPT

## 2019-07-25 NOTE — PROGRESS NOTES
PT DAILY TREATMENT NOTE 10-18    Patient Name: Rizwana Santacruz  Date:2019  : 1950  [x]  Patient  Verified  Payor: VA MEDICARE / Plan: VA MEDICARE PART A & B / Product Type: Medicare /    In time:10:05  Out time:10:36  Total Treatment Time (min): 31  Visit #: 6 of 8    Medicare/BCBS Only   Total Timed Codes (min):  31 1:1 Treatment Time:  27       Treatment Area: Left foot pain [M79.672]    SUBJECTIVE  Pain Level (0-10 scale): 0/10  Any medication changes, allergies to medications, adverse drug reactions, diagnosis change, or new procedure performed?: [x] No    [] Yes (see summary sheet for update)  Subjective functional status/changes:   [] No changes reported  \"No pain today. \"    OBJECTIVE    23 min Therapeutic Exercise:  [x] See flow sheet :   Rationale: increase ROM, increase strength and increase proprioception to improve the patients ability to perform ADL's.     8 min Manual Therapy:  Left talocrural and subtalar joint mobs, ankle PROM, gastroc stretch. Rationale: decrease pain, increase ROM and increase tissue extensibility to improve heel/toe gait pattern and standing/walking tolerance. With   [x] TE   [] TA   [] neuro   [] other: Patient Education: [x] Review HEP    [] Progressed/Changed HEP based on:   [] positioning   [] body mechanics   [] transfers   [] heat/ice application    [] other:      Other Objective/Functional Measures: No change in there ex. Pain Level (0-10 scale) post treatment: 0/10    ASSESSMENT/Changes in Function: Cueing for heel strike during gait. Pt has made considerable progress towards goals, plan to D/C to HEP next visit. Patient will continue to benefit from skilled PT services to modify and progress therapeutic interventions, address functional mobility deficits, address ROM deficits, address strength deficits, analyze and address soft tissue restrictions and analyze and modify body mechanics/ergonomics to attain remaining goals.      [x]  See Plan of Care  []  See progress note/recertification  []  See Discharge Summary         Progress towards goals / Updated goals:  1. The patient will improve FOTO score to 58 to maximize quality of life. - FOTO 32%. 6/24/2019  2. The patient will improve left DF ankle ROM actively to 15 to improve ease of descending stairs with greater efficiency. - AROM Left ankle DF 9 degrees. 7/11/2019  3. The patient will demonstrate left inversion strength of 5/5 MMT to improve efficiency of arch preservation during stance phase and maximize ambulation efficiency. - MMT Left Inversion 4+/5. 6/24/2019; 4+/5. 7/24/2019  4.  The patient will demonstrate single leg stance stance for 30 seconds to improve stability in stance for return to PLOF.  - Left SLS 5\" - 6/24/2019    PLAN  []  Upgrade activities as tolerated     []  Continue plan of care  []  Update interventions per flow sheet       []  Discharge due to:_  []  Other:_      Mahi Arzate PTA 7/25/2019  10:19 AM    Future Appointments   Date Time Provider Daryl Tanner   7/30/2019 10:00 AM Kolby CASTRO, PT [facility]

## 2019-07-30 ENCOUNTER — APPOINTMENT (OUTPATIENT)
Dept: PHYSICAL THERAPY | Age: 69
End: 2019-07-30
Payer: MEDICARE

## 2019-08-02 ENCOUNTER — HOSPITAL ENCOUNTER (OUTPATIENT)
Dept: PHYSICAL THERAPY | Age: 69
Discharge: HOME | End: 2019-08-02
Payer: MEDICARE

## 2019-08-02 PROCEDURE — 97140 MANUAL THERAPY 1/> REGIONS: CPT

## 2019-08-02 PROCEDURE — 97110 THERAPEUTIC EXERCISES: CPT

## 2019-08-02 NOTE — PROGRESS NOTES
[facility]  [address]  [place], [address]  [phone] fax    Physical Therapy Discharge Summary    Patient name: Cathie SPEAR of Care: 5/6/2019   Referral source: Nyasia Ramirez MD LFL: [birth year]                Medical Diagnosis: Left foot pain [M79.672]  Payor: BLUE CROSS / Plan: [facility] PPO / Product Type: PPO /  Onset Date:January 2019                Treatment Diagnosis: Left plantar fasciitis   Prior Hospitalization: see medical history Provider#: [id number]   Medications: Verified on Patient summary List    Comorbidities: None   Prior Level of Function: The patient was able to ambulate without difficulty. Visits from Start of Care: 13    Missed Visits: 5  Reporting Period : 6/24/2019to 8/02/2019    Summary of Care:  1. The patient will improve FOTO score to 58 to maximize quality of life. - FOTO 32%. 6/24/2019; Met 73 8/02/2019  2. The patient will improve left DF ankle ROM actively to 15 to improve ease of descending stairs with greater efficiency. - AROM Left ankle DF 9 degrees. 7/11/2019  3. The patient will demonstrate left inversion strength of 5/5 MMT to improve efficiency of arch preservation during stance phase and maximize ambulation efficiency. - MMT Left Inversion 4+/5. 6/24/2019; 4+/5. 7/24/2019  4. The patient will demonstrate single leg stance stance for 30 seconds to improve stability in stance for return to PLOF.  - Left SLS 5\" - 6/24/2019    ASSESSMENT/RECOMMENDATIONS: The patient has significantly improved her ROM as well as her quality of life per FOTO assessment. She has been discharged to continue HEP.   []Discontinue therapy: []Patient has reached or is progressing toward set goals      []Patient is non-compliant or has abdicated      []Due to lack of appreciable progress towards set [address], PT 8/2/2019 12:17 PM

## 2019-08-02 NOTE — PROGRESS NOTES
PT DAILY TREATMENT NOTE 10-18    Patient Name: Marcia Langley  Date:2019  : 1950  [x]  Patient  Verified  Payor: [place] MEDICARE / Plan: [place] MEDICARE PART A & B / Product Type: Medicare /    In time:11:30  Out time:12:06  Total Treatment Time (min): 36  Visit #: 7 of 8    Medicare/BCBS Only   Total Timed Codes (min):  36 1:1 Treatment Time:  30       Treatment Area: Left foot pain [M79.672]    SUBJECTIVE  Pain Level (0-10 scale): 2/10  Any medication changes, allergies to medications, adverse drug reactions, diagnosis change, or new procedure performed?: [x] No    [] Yes (see summary sheet for update)  Subjective functional status/changes:   [] No changes reported  The patient states that her foot is feeling better, and that she has been compliant with HEP and her stretches. She indicates she is ready to complete today and self manage with HEP. OBJECTIVE  28 min Therapeutic Exercise:  [x] See flow sheet :   Rationale: increase ROM and increase strength to improve the patients ability to improve ADL ease. 8 min Manual Therapy:  Graston Technique Treatment Intervention:    Patient positioning: prone    Treatment location: left gastroc/achilles, plantar fascia. Graston Technique Instruments utilized: GT4, GT5    Explained effects and benefits of Graston Technique, including potential for post treatment soreness and bruising. Patient was provided the opportunity to ask any questions and verbalized understanding. Patient wished to proceed with treatment. Rationale: decrease pain, increase ROM and increase tissue extensibility to improve ADL ease. With   [] TE   [] TA   [] neuro   [] other: Patient Education: [x] Review HEP    [] Progressed/Changed HEP based on:   [] positioning   [] body mechanics   [] transfers   [] heat/ice application    [] other:      Other Objective/Functional Measures:    FOTO: 73     Pain Level (0-10 scale) post treatment: 0/10    ASSESSMENT/Changes in Function: The patient has significantly improved her ROM as well as her quality of life per FOTO assessment. She has been discharged to continue HEP. []  See Plan of Care  []  See progress note/recertification  []  See Discharge Summary         Progress towards goals / Updated goals:  1. The patient will improve FOTO score to 58 to maximize quality of life. - FOTO 32%. 6/24/2019; Met 73 8/02/2019  2. The patient will improve left DF ankle ROM actively to 15 to improve ease of descending stairs with greater efficiency. - AROM Left ankle DF 9 degrees. 7/11/2019  3. The patient will demonstrate left inversion strength of 5/5 MMT to improve efficiency of arch preservation during stance phase and maximize ambulation efficiency. - MMT Left Inversion 4+/5. 6/24/2019; 4+/5. 7/24/2019  4. The patient will demonstrate single leg stance stance for 30 seconds to improve stability in stance for return to PLOF.  - Left SLS 5\" - 6/24/2019    PLAN  []  Upgrade activities as tolerated     [x]  Continue plan of care  []  Update interventions per flow sheet       []  Discharge due to:_  []  Other:_      Julian Gonzalez, PT 8/2/2019  12:06 PM    No future appointments.

## 2019-10-07 ENCOUNTER — HOSPITAL ENCOUNTER (OUTPATIENT)
Dept: ULTRASOUND IMAGING | Age: 69
Discharge: HOME | End: 2019-10-07
Attending: INTERNAL MEDICINE
Payer: MEDICARE

## 2019-10-07 DIAGNOSIS — R09.89 GLOBUS SENSATION: ICD-10-CM

## 2019-10-07 PROCEDURE — 76536 US EXAM OF HEAD AND NECK: CPT

## 2020-08-12 NOTE — PROGRESS NOTES
[facility]  [address]  [place], [address]  [phone] fax    Medicare Progress Report  Patient name: Krishna Us Start of Care: 2019   Referral source: Thony Leal MD : 1950                Medical Diagnosis: Left foot pain [M79.672]  Payor: BLUE CROSS / Plan: [facility] PPO / Product Type: PPO /  Onset Date:2019                Treatment Diagnosis: Left plantar fasciitis   Prior Hospitalization: see medical history Provider#: [id number]   Medications: Verified on Patient summary List    Comorbidities: None   Prior Level of Function: The patient was able to ambulate without difficulty. Visits from Start of Care: 6    Missed Visits: 4    Reporting Period: 2019 to 2019    Subjective Reports: \"I don't know why it's not healed yet. I need a Doctors note because I see Doctor on Wednesday. \"    Current Status/ treatment goals Objective measures   Short term goals to be accomplished within 2 weeks:  1. The patient will demonstrate independence and compliance with HEP to maximize therapeutic benefit. Met 2019   2. The patient will improve left ankle DF passively to 15 to improve ease of ambulation. Met - 12 degrees 2019  Long term goals to be accomplished within 4 weeks:  1. The patient will improve FOTO score to 58 to maximize quality of life. - FOTO 32%. 2019  2. The patient will improve left DF ankle ROM actively to 12 to improve ease of descending stairs with greater efficiency. Met - 13 degrees 2019  3. The patient will demonstrate left inversion strength of 5/5 MMT to improve efficiency of arch preservation during stance phase and maximize ambulation efficiency. - MMT Left Inversion 4+/5. 2019  4.  The patient will demonstrate single leg stance stance for 30 seconds to improve stability in stance for return to PLOF.  - Left SLS 5\". 2019    Key functional changes: DF ROM improvements, Inversion strength improvements allowing for improved stability and ease of ambulation, though increased pain thus FOTO score decrease due to lack of attendance. Problems/ barriers to goal attainment: Lack of attendance over past 3 weeks. Assessment / Recommendations:DF ROM improvements, Inversion strength improvements allowing for improved stability and ease of ambulation, though increased pain thus FOTO score decrease due to lack of attendance. The patient will continue to benefit from skilled PT in order to address remaining impairments. Problem List: pain affecting function, decrease ROM, decrease strength, decrease ADL/ functional abilitiies, decrease activity tolerance and decrease flexibility/ joint mobility   Treatment Plan: Therapeutic exercise, Therapeutic activities, Neuromuscular re-education, Physical agent/modality, Manual therapy, Patient education and Functional mobility training    Patient Goal (s) has been updated and includes: Decrease pain     Updated Goals to be accomplished in 3-4 weeks:  1. The patient will improve FOTO score to 58 to maximize quality of life. - FOTO 32%. 6/24/2019  2. The patient will improve left DF ankle ROM actively to 15 to improve ease of descending stairs with greater efficiency.   3. The patient will demonstrate left inversion strength of 5/5 MMT to improve efficiency of arch preservation during stance phase and maximize ambulation efficiency. - MMT Left Inversion 4+/5. 6/24/2019  4.  The patient will demonstrate single leg stance stance for 30 seconds to improve stability in stance for return to PLOF.  - Left SLS 5\". 6/24/2019    Frequency / Duration: Patient to be seen 2 times per week for 4 weeks:      Renee Omalley, PT 6/24/2019 1:55 PM Mart-1 - Negative Histology Text: MART-1 staining demonstrates a normal density and pattern of melanocytes along the dermal-epidermal junction. The surgical margins are negative for tumor cells.

## 2021-02-15 ENCOUNTER — TRANSCRIBE ORDER (OUTPATIENT)
Dept: SCHEDULING | Age: 71
End: 2021-02-15

## 2021-02-15 DIAGNOSIS — E04.2 NONTOXIC MULTINODULAR GOITER: Primary | ICD-10-CM

## 2022-04-22 ENCOUNTER — HOSPITAL ENCOUNTER (OUTPATIENT)
Dept: PHYSICAL THERAPY | Age: 72
Discharge: HOME | End: 2022-04-22
Payer: MEDICARE

## 2022-04-22 PROCEDURE — 97535 SELF CARE MNGMENT TRAINING: CPT

## 2022-04-22 PROCEDURE — 97110 THERAPEUTIC EXERCISES: CPT

## 2022-04-22 PROCEDURE — 97161 PT EVAL LOW COMPLEX 20 MIN: CPT

## 2022-04-22 NOTE — PROGRESS NOTES
PT DAILY TREATMENT NOTE     Patient Name: Cuco Lee  Date:2022  : 1950  [x]  Patient  Verified  Payor: VA MEDICARE / Plan: VA MEDICARE PART A & B / Product Type: Medicare /    In time:12:44  Out time:1:28  Total Treatment Time (min): 44  Visit #: 1 of 8    Medicare/BCBS Only   Total Timed Codes (min):  23 1:1 Treatment Time:  44       Treatment Area: Bilateral primary osteoarthritis of first carpometacarpal joints [M18.0]    SUBJECTIVE  Pain Level (0-10 scale): 6  Any medication changes, allergies to medications, adverse drug reactions, diagnosis change, or new procedure performed?: [x] No    [] Yes (see summary sheet for update)  Subjective functional status/changes:   [] No changes reported  The pt reports pain with WB'ing activity and morning stiffness    OBJECTIVE    23 min [x]Eval                  []Re-Eval       11 min Therapeutic Exercise:  [] See flow sheet :   Rationale: increase ROM and increase strength to improve the patients ability to perform daily tasks and self care    12 min Therapeutic Activity:  []  See flow sheet :   Rationale: pt education and instruction  to improve the patients ability to self manage symptoms and maximize therapeutic effect             With   [] TE   [] TA   [] neuro   [] other: Patient Education: [x] Review HEP    [] Progressed/Changed HEP based on:   [] positioning   [] body mechanics   [] transfers   [] heat/ice application    [] other:      Other Objective/Functional Measures:      Pain Level (0-10 scale) post treatment: 5    ASSESSMENT/Changes in Function: see POC    Patient will continue to benefit from skilled PT services to modify and progress therapeutic interventions, address functional mobility deficits, address ROM deficits, address strength deficits, analyze and address soft tissue restrictions, analyze and cue movement patterns and analyze and modify body mechanics/ergonomics to attain remaining goals.      [x]  See Plan of Care  [] See progress note/recertification  []  See Discharge Summary         Progress towards goals / Updated goals:  Short Term Goals: To be accomplished in 2 weeks:  1. Pt will demonstrate I and compliance with HEP to maximize therapeutic effect. IE: HEP issued and instructed  2. Pt will demonstrate left knee AROM 0-110 deg for improved ease of dressing. IE: 1-100  Long Term Goals: To be accomplished in 4 weeks:  1. Pt will demonstrate left knee AROM 0-120 deg for improved ease of transfers. IE: 1-100  2. Pt will demonstrate 15 SLR without quad lag or pain to improve quad strength in standing bilaterally. IE: challenged with SLR  3. Pt will negotiate 4 stairs x 3 with single hand rail void of compensation or instability to improve home negotiation ease. IE: pt with difficulty negotiating stairs   4. Pt will improve FOTO score to predicted outcome to demonstrate improved quality of life and function.    IE: FOTO site down    PLAN  []  Upgrade activities as tolerated     [x]  Continue plan of care  []  Update interventions per flow sheet       []  Discharge due to:_  []  Other:_      Myrna Schultz DPT CMTPT 4/22/2022  1:46 PM    Future Appointments   Date Time Provider [place]aryl Tanner   4/25/2022  9:00 AM Thanh Hagen, PT [facility]   4/27/2022  7:00 AM Darral Ripa [facility]   5/3/2022  5:15 PM [place], [address] [facility]   5/5/2022  5:15 PM [place], [address] [facility]   5/10/2022  5:15 PM [place], [address] [facility]   5/12/2022  6:00 PM Darral Ripa [facility]   5/17/2022  5:15 PM [place], [address] [facility]   5/19/2022  5:15 PM Darral Ripa [facility]

## 2022-04-22 NOTE — PROGRESS NOTES
[facility]  [address]  [place], [address]  [phone] fax    Plan of Care/ Statement of Necessity for Physical Therapy Services    Patient name: Thom Blanchard Start of Care: 2022   Referral source: Shyla Castañeda NP : 1950    Medical Diagnosis: Bilateral primary osteoarthritis of first carpometacarpal joints [M18.0]  Payor: VA MEDICARE / Plan: VA MEDICARE PART A & B / Product Type: Medicare /  Onset Date:2022    Treatment Diagnosis: B knee pain   Prior Hospitalization: see medical history Provider#: [id number]   Medications: Verified on Patient summary List    Comorbidities: none reported   Prior Level of Function: Pt with improved ambulation and ADL performance with occasional gym participation      The Plan of Care and following information is based on the information from the initial evaluation. Assessment/ key information: The pt is a 74 y/o F presenting with c/o left > right knee pain for the past 2 months. She reports awaking with symptoms, unsure of any mechanism. Pt reports her imaging indicated arthritis but not severe per her MD. [place] morning pain 30-45 minutes that improves after. Limited with ambulation tolerance and is challenged with knee flexion mobility due to pain. Good knee MMT noted today, slight challenge with SLR. Closed chain quad activation and palpation noted to be localized to medial joint line on left. No specific TTP on right. Signs and symptoms consistent with arthritis. Pt would benefit from PT to improve ROM, pain and strength to return to PLOF.     Evaluation Complexity History LOW Complexity : Zero comorbidities / personal factors that will impact the outcome / POC; Examination MEDIUM Complexity : 3 Standardized tests and measures addressing body structure, function, activity limitation and / or participation in recreation  ;Presentation LOW Complexity : Stable, uncomplicated ;Clinical Decision Making Other outcome measures Clinical judgement  LOW   Overall Complexity Rating: LOW   Problem List: pain affecting function, decrease ROM, decrease strength, impaired gait/ balance, decrease ADL/ functional abilitiies, decrease activity tolerance and decrease flexibility/ joint mobility   Treatment Plan may include any combination of the following: Therapeutic exercise, Therapeutic activities, Neuromuscular re-education, Physical agent/modality, Manual therapy, Patient education, Self Care training, Functional mobility training and Stair training  Patient / Family readiness to learn indicated by: asking questions and trying to perform skills  Persons(s) to be included in education: patient (P)  Barriers to Learning/Limitations: None  Patient Goal (s): Get rid of the pain  Patient Self Reported Health Status: good  Rehabilitation Potential: good    Short Term Goals: To be accomplished in 2 weeks:  1. Pt will demonstrate I and compliance with HEP to maximize therapeutic effect. 2. Pt will demonstrate left knee AROM 0-110 deg for improved ease of dressing. Long Term Goals: To be accomplished in 4 weeks:  1. Pt will demonstrate left knee AROM 0-120 deg for improved ease of transfers. 2. Pt will demonstrate 15 SLR without quad lag or pain to improve quad strength in standing bilaterally. 3. Pt will negotiate 4 stairs x 3 with single hand rail void of compensation or instability to improve home negotiation ease. 4. Pt will improve FOTO score to predicted outcome to demonstrate improved quality of life and function. Frequency / Duration: Patient to be seen 2 times per week for 4 weeks.     Patient/ Caregiver education and instruction: Diagnosis, prognosis, self care, activity modification and exercises   [x]  Plan of care has been reviewed with PTA    Certification Period: 4/22/2022 to 5/21/2022  Emerald Calderon DPT CMTPT 4/22/2022 1:39 PM    ________________________________________________________________________    I certify that the above Therapy Services are being furnished while the patient is under my care. I agree with the treatment plan and certify that this therapy is necessary.     [de-identified] Signature:____________________  Date:____________Time: _________     Walter uMeller NP  Please sign and return to [facility] [address] [place] [address] & [facility]  [address] [place] [address] [address]  [phone] fax

## 2022-04-25 ENCOUNTER — HOSPITAL ENCOUNTER (OUTPATIENT)
Dept: PHYSICAL THERAPY | Age: 72
Discharge: HOME | End: 2022-04-25
Payer: MEDICARE

## 2022-04-25 PROCEDURE — 97112 NEUROMUSCULAR REEDUCATION: CPT

## 2022-04-25 PROCEDURE — 97140 MANUAL THERAPY 1/> REGIONS: CPT

## 2022-04-25 PROCEDURE — 97110 THERAPEUTIC EXERCISES: CPT

## 2022-04-25 NOTE — PROGRESS NOTES
PT DAILY TREATMENT NOTE     Patient Name: Master Hansen  Date:2022  : 1950  [x]  Patient  Verified  Payor: VA MEDICARE / Plan: VA MEDICARE PART A & B / Product Type: Medicare /    In time:905  Out time:955  Total Treatment Time (min): 40  Visit #: 2 of 8    Medicare/BCBS Only   Total Timed Codes (min):  40 1:1 Treatment Time:  40       Treatment Area: Bilateral primary osteoarthritis of first carpometacarpal joints [M18.0]    SUBJECTIVE  Pain Level (0-10 scale): 6/10  Any medication changes, allergies to medications, adverse drug reactions, diagnosis change, or new procedure performed?: [x] No    [] Yes (see summary sheet for update)  Subjective functional status/changes:   [] No changes reported  \" Needed to wear heals to Faith and knees really bothering after, Left side much worse\"    OBJECTIVE    Modality rationale: decrease inflammation and decrease pain to improve the patients ability to ambulate with ease. Min Type Additional Details   10 [x]  Ice     []  heat  []  Ice massage  []  Laser   []  Anodyne Position: supine Wedge  Location: Bilateral knees. 22 min Therapeutic Exercise:  [x] See flow sheet : Reviewed HEP,    Rationale: increase ROM and increase strength to improve the patients ability to perform ADL's with ease. 10 min Neuromuscular Re-education:  [x]  See flow sheet : Quad setting/ lateral gluet to stabilize patella/pelvis   Rationale: increase strength and increase proprioception  to improve the patients ability to stabilize pelvis/LE to improve ease of movement. 8 min Manual Therapy:  Patella Mobilization, STM L>R   The manual therapy interventions were performed at a separate and distinct time from the therapeutic activities interventions. Rationale: decrease pain, increase ROM and increase tissue extensibility to improve gait and transfer ease.              With   [x] TE   [] TA   [x] neuro   [] other: Patient Education: [x] Review HEP    [] Progressed/Changed HEP based on:   [] positioning   [] body mechanics   [] transfers   [] heat/ice application    [] other:      Other Objective/Functional Measures: Mini Left SLR with Quad set. Pain Level (0-10 scale) post treatment: 0/10    ASSESSMENT/Changes in Function: Left lateral patella tracking, Left quad lag, Challenged with SLR on Left    Patient will continue to benefit from skilled PT services to modify and progress therapeutic interventions, address functional mobility deficits, address ROM deficits, address strength deficits, analyze and address soft tissue restrictions and analyze and cue movement patterns to attain remaining goals. [x]  See Plan of Care  []  See progress note/recertification  []  See Discharge Summary         Progress towards goals / Updated goals:  Short Term Goals: To be accomplished in 2 weeks:  1. Pt will demonstrate I and compliance with HEP to maximize therapeutic effect. IE: HEP issued and instructed  2. Pt will demonstrate left knee AROM 0-110 deg for improved ease of dressing. IE: 1-100  Long Term Goals: To be accomplished in 4 weeks:  1. Pt will demonstrate left knee AROM 0-120 deg for improved ease of transfers. IE: 1-100  2. Pt will demonstrate 15 SLR without quad lag or pain to improve quad strength in standing bilaterally. IE: challenged with SLR  3. Pt will negotiate 4 stairs x 3 with single hand rail void of compensation or instability to improve home negotiation ease. IE: pt with difficulty negotiating stairs   4. Pt will improve FOTO score to predicted outcome to demonstrate improved quality of life and function.               IE: FOTO site down    PLAN  []  Upgrade activities as tolerated     [x]  Continue plan of care  []  Update interventions per flow sheet       []  Discharge due to:_  []  Other:_      Jeni Zuluaga, PT 4/25/2022  8:26 AM    Future Appointments   Date Time Provider [place]   4/25/2022  9:00 AM Thanh Hagen, PT [facility]   4/27/2022  7:00 AM José Thomas [facility]   5/3/2022  5:15 PM [place], [address] [facility]   5/5/2022  5:15 PM [place], [address] [facility]   5/10/2022  5:15 PM [place], [address] [facility]   5/12/2022  6:00 PM José Thomas [facility]   5/17/2022  5:15 PM [place], [address] [facility]   5/19/2022  5:15 PM [place], [address] [facility]

## 2022-04-27 ENCOUNTER — HOSPITAL ENCOUNTER (OUTPATIENT)
Dept: PHYSICAL THERAPY | Age: 72
Discharge: HOME | End: 2022-04-27
Payer: MEDICARE

## 2022-04-27 PROCEDURE — 97110 THERAPEUTIC EXERCISES: CPT

## 2022-04-27 PROCEDURE — 97112 NEUROMUSCULAR REEDUCATION: CPT

## 2022-04-27 NOTE — PROGRESS NOTES
PT DAILY TREATMENT NOTE     Patient Name: Modesta Elmore  Date:2022  : 1950  [x]  Patient  Verified  Payor: [place] MEDICARE / Plan: [place] MEDICARE PART A & B / Product Type: Medicare /    In time:7:00  Out time:7:50  Total Treatment Time (min): 50  Visit #: 3 of 8    Medicare/BCBS Only   Total Timed Codes (min):  42 1:1 Treatment Time:  42       Treatment Area: Bilateral primary osteoarthritis of first carpometacarpal joints [M18.0]    SUBJECTIVE  Pain Level (0-10 scale): 2-3  Any medication changes, allergies to medications, adverse drug reactions, diagnosis change, or new procedure performed?: [x] No    [] Yes (see summary sheet for update)  Subjective functional status/changes:   [] No changes reported  The pt reports she was quite sore and uncomfortable for a day after her last visit.  Reports onset of left knee discomfort night of therapy visit and lasting through yesterday until she awoke this morning    \"Felt like my bones were mashing together when I walked\"    OBJECTIVE    Modality rationale: decrease inflammation and decrease pain to improve the patients ability to perform ADLs   Min Type Additional Details    [] Estim:  []Unatt       []IFC  []Premod                        []Other:  []w/ice   []w/heat  Position:  Location:    [] Estim: []Att    []TENS instruct  []NMES                    []Other:  []w/US   []w/ice   []w/heat  Position:  Location:    []  Traction: [] Cervical       []Lumbar                       [] Prone          []Supine                       []Intermittent   []Continuous Lbs:  [] before manual  [] after manual    []  Ultrasound: []Continuous   [] Pulsed                           []1MHz   []3MHz W/cm2:  Location:    []  Iontophoresis with dexamethasone         Location: [] Take home patch   [] In clinic   8 [x]  Ice     []  heat  []  Ice massage  []  Laser   []  Anodyne Position: supine with wedge  Location: B knees    []  Laser with stim  []  Other:  Position:  Location: []  Vasopneumatic Device    []  Right     []  Left  Pre-treatment girth:  Post-treatment girth:  Measured at (location):  Pressure:       [] lo [] med [] hi   Temperature: [] lo [] med [] hi   [] Skin assessment post-treatment:  []intact []redness- no adverse reaction    []redness - adverse reaction:       34 min Therapeutic Exercise:  [] See flow sheet :   Rationale: increase ROM and increase strength to improve the patients ability to perform daily tasks and self care       8 min Neuromuscular Re-education:  []  See flow sheet :   Rationale: increase ROM, increase strength and increase proprioception  to improve the patients ability to perform functional tasks with improved mechanics and proprioception            With   [] TE   [] TA   [] neuro   [] other: Patient Education: [x] Review HEP    [] Progressed/Changed HEP based on:   [] positioning   [] body mechanics   [] transfers   [] heat/ice application    [] other:      Other Objective/Functional Measures: held manual today due to reported response after last visit, though seems as possibly delayed soreness post session     Pain Level (0-10 scale) post treatment: 0    ASSESSMENT/Changes in Function: The pt demonstrates good response to exercise only session today with reports of no pain prior to cold pack. Advised to monitor her response over the next 24 hours and will modify or progress flowsheet as able. Continue with ROM progression and gradual LE strength as muscle irritation settles down    Patient will continue to benefit from skilled PT services to modify and progress therapeutic interventions, address functional mobility deficits, address ROM deficits, address strength deficits, analyze and address soft tissue restrictions, analyze and cue movement patterns and analyze and modify body mechanics/ergonomics to attain remaining goals.      []  See Plan of Care  []  See progress note/recertification  []  See Discharge Summary         Progress towards goals / Updated goals:  Short Term Goals: To be accomplished in 2 weeks:  1. Pt will demonstrate I and compliance with HEP to maximize therapeutic effect.              NG: HEP issued and instructed   Current: pt reports compliance to date 4/27/2022  2. Pt will demonstrate left knee AROM 0-110 deg for improved ease of dressing.              IE: 1-100  Long Term Goals: To be accomplished in 4 weeks:  1. Pt will demonstrate left knee AROM 0-120 deg for improved ease of transfers.              IE: 1-100  2. Pt will demonstrate 15 SLR without quad lag or pain to improve quad strength in standing bilaterally.              IE: challenged with SLR  3. Pt will negotiate 4 stairs x 3 with single hand rail void of compensation or instability to improve home negotiation ease.              IE: pt with difficulty negotiating stairs   4.  Pt will improve FOTO score to predicted outcome to demonstrate improved quality of life and function.              IE: FOTO site down    PLAN  []  Upgrade activities as tolerated     []  Continue plan of care  []  Update interventions per flow sheet       []  Discharge due to:_  []  Other:_      Giana Shah DPT CMTPT 4/27/2022  7:11 AM    Future Appointments   Date Time Provider Daryl Tanner   5/3/2022  5:15 PM Corina Herrera, [address] [facility]   5/5/2022  5:15 PM Antony, [address] [facility]   5/10/2022  5:15 PM Antony, [address] [facility]   5/12/2022  6:00 PM Fauzia Story [facility]   5/17/2022  5:15 PM Antony, [address] [facility]   5/19/2022  5:15 [facility]

## 2022-05-03 ENCOUNTER — HOSPITAL ENCOUNTER (OUTPATIENT)
Dept: PHYSICAL THERAPY | Age: 72
Discharge: HOME | End: 2022-05-03
Payer: MEDICARE

## 2022-05-03 PROCEDURE — 97112 NEUROMUSCULAR REEDUCATION: CPT

## 2022-05-03 PROCEDURE — 97110 THERAPEUTIC EXERCISES: CPT

## 2022-05-03 NOTE — PROGRESS NOTES
PT DAILY TREATMENT NOTE     Patient Name: Pearly Aschoff  Date:5/3/2022  : 1950  [x]  Patient  Verified  Payor: [place] MEDICARE / Plan: VA MEDICARE PART A & B / Product Type: Medicare /    In time:5:13  Out time:6:06  Total Treatment Time (min): 53  Visit #: 4 of 8    Medicare/BCBS Only   Total Timed Codes (min):  43 1:1 Treatment Time:  43       Treatment Area: Bilateral primary osteoarthritis of first carpometacarpal joints [M18.0]    SUBJECTIVE  Pain Level (0-10 scale): 3  Any medication changes, allergies to medications, adverse drug reactions, diagnosis change, or new procedure performed?: [x] No    [] Yes (see summary sheet for update)  Subjective functional status/changes:   [] No changes reported  The pt reports she is having more difficulty with sleeping some nights. She does feel she is improved from [facility] though.  Good response to last session    OBJECTIVE    Modality rationale: decrease inflammation and decrease pain to improve the patients ability to perform ADLs   Min Type Additional Details    [] Estim:  []Unatt       []IFC  []Premod                        []Other:  []w/ice   []w/heat  Position:  Location:    [] Estim: []Att    []TENS instruct  []NMES                    []Other:  []w/US   []w/ice   []w/heat  Position:  Location:    []  Traction: [] Cervical       []Lumbar                       [] Prone          []Supine                       []Intermittent   []Continuous Lbs:  [] before manual  [] after manual    []  Ultrasound: []Continuous   [] Pulsed                           []1MHz   []3MHz W/cm2:  Location:    []  Iontophoresis with dexamethasone         Location: [] Take home patch   [] In clinic   10 [x]  Ice     []  heat  []  Ice massage  []  Laser   []  Anodyne Position: supine with wedge  Location: B knees    []  Laser with stim  []  Other:  Position:  Location:    []  Vasopneumatic Device    []  Right     []  Left  Pre-treatment girth:  Post-treatment girth:  Measured at (location):  Pressure:       [] lo [] med [] hi   Temperature: [] lo [] med [] hi   [] Skin assessment post-treatment:  []intact []redness- no adverse reaction    []redness - adverse reaction:         33 min Therapeutic Exercise:  [] See flow sheet :   Rationale: increase ROM and increase strength to improve the patients ability to perform daily tasks and self care    10 min Neuromuscular Re-education:  []  See flow sheet :   Rationale: increase strength, improve coordination and increase proprioception  to improve the patients ability to perform functional tasks with improved mechanics           With   [] TE   [] TA   [] neuro   [] other: Patient Education: [x] Review HEP    [] Progressed/Changed HEP based on:   [] positioning   [] body mechanics   [] transfers   [] heat/ice application    [] other:      Other Objective/Functional Measures:      Pain Level (0-10 scale) post treatment: 0    ASSESSMENT/Changes in Function: Pt reports improvement in knee pain and comfort when sleeping since SOC. Still noting some difficulty with prolonged extension, some effusion noted in popliteal region on left today with reports of flexion ROM limitation due to pinch in this region. Will continue with ROM and gentle strength progression    Patient will continue to benefit from skilled PT services to modify and progress therapeutic interventions, address functional mobility deficits, address ROM deficits, address strength deficits, analyze and address soft tissue restrictions, analyze and cue movement patterns and analyze and modify body mechanics/ergonomics to attain remaining goals. []  See Plan of Care  []  See progress note/recertification  []  See Discharge Summary         Progress towards goals / Updated goals:  Short Term Goals: To be accomplished in 2 weeks:  1.  Pt will demonstrate I and compliance with HEP to maximize therapeutic effect.              IE: HEP issued and instructed              Current: pt reports compliance to date 4/27/2022  2. Pt will demonstrate left knee AROM 0-110 deg for improved ease of dressing.              IE: 1-100   Current: progressing 0-105 deg 5/3/2022  Long Term Goals: To be accomplished in 4 weeks:  1. Pt will demonstrate left knee AROM 0-120 deg for improved ease of transfers.              IE: 1-100  2. Pt will demonstrate 15 SLR without quad lag or pain to improve quad strength in standing bilaterally.              IE: challenged with SLR  3. Pt will negotiate 4 stairs x 3 with single hand rail void of compensation or instability to improve home negotiation ease.              IE: pt with difficulty negotiating stairs   4.  Pt will improve FOTO score to predicted outcome to demonstrate improved quality of life and function.              IE: FOTO site down    PLAN  []  Upgrade activities as tolerated     []  Continue plan of care  []  Update interventions per flow sheet       []  Discharge due to:_  []  Other:_      Sophy Parish DPT CMTPT 5/3/2022  5:23 PM    Future Appointments   Date Time Provider Daryl Tanner   5/5/2022  5:15 PM Vanessa Calderon, [address] [place] [address]   5/10/2022  5:15 PM Antony, [address] [facility]   5/12/2022  6:00 PM [facility]   5/17/2022  5:15 PM Antony, [id number] [facility]   5/19/2022  5:15 PM Coral Car [facility]

## 2022-05-05 ENCOUNTER — APPOINTMENT (OUTPATIENT)
Dept: PHYSICAL THERAPY | Age: 72
End: 2022-05-05
Payer: MEDICARE

## 2022-05-10 ENCOUNTER — HOSPITAL ENCOUNTER (OUTPATIENT)
Dept: PHYSICAL THERAPY | Age: 72
Discharge: HOME | End: 2022-05-10
Payer: MEDICARE

## 2022-05-10 PROCEDURE — 97112 NEUROMUSCULAR REEDUCATION: CPT

## 2022-05-10 PROCEDURE — 97110 THERAPEUTIC EXERCISES: CPT

## 2022-05-10 NOTE — PROGRESS NOTES
PT DAILY TREATMENT NOTE     Patient Name: Clarita Torres  Date:5/10/2022  : 1950  [x]  Patient  Verified  Payor: VA MEDICARE / Plan: VA MEDICARE PART A & B / Product Type: Medicare /    In time:5:26  Out time:6:08  Total Treatment Time (min): 42  Visit #: 5 of 8    Medicare/BCBS Only   Total Timed Codes (min):  32 1:1 Treatment Time:  32       Treatment Area: Bilateral primary osteoarthritis of first carpometacarpal joints [M18.0]    SUBJECTIVE  Pain Level (0-10 scale): 3-4  Any medication changes, allergies to medications, adverse drug reactions, diagnosis change, or new procedure performed?: [x] No    [] Yes (see summary sheet for update)  Subjective functional status/changes:   [] No changes reported  The pt reports that she feels more anterior knee discomfort on the left today.  \"Like someone kicked me in the knee cap\"    OBJECTIVE    Modality rationale: decrease inflammation and decrease pain to improve the patients ability to perform ADLs   Min Type Additional Details    [] Estim:  []Unatt       []IFC  []Premod                        []Other:  []w/ice   []w/heat  Position:  Location:    [] Estim: []Att    []TENS instruct  []NMES                    []Other:  []w/US   []w/ice   []w/heat  Position:  Location:    []  Traction: [] Cervical       []Lumbar                       [] Prone          []Supine                       []Intermittent   []Continuous Lbs:  [] before manual  [] after manual    []  Ultrasound: []Continuous   [] Pulsed                           []1MHz   []3MHz W/cm2:  Location:    []  Iontophoresis with dexamethasone         Location: [] Take home patch   [] In clinic   10 [x]  Ice     []  heat  []  Ice massage  []  Laser   []  Anodyne Position: supine with wedge  Location: left knee    []  Laser with stim  []  Other:  Position:  Location:    []  Vasopneumatic Device    []  Right     []  Left  Pre-treatment girth:  Post-treatment girth:  Measured at (location):  Pressure:       [] lo [] med [] hi   Temperature: [] lo [] med [] hi   [] Skin assessment post-treatment:  []intact []redness- no adverse reaction    []redness - adverse reaction:         16 min Therapeutic Exercise:  [] See flow sheet :   Rationale: increase ROM and increase strength to improve the patients ability to perform daily tasks     8 min Neuromuscular Re-education:  []  See flow sheet :   Rationale: increase ROM, increase strength and increase proprioception  to improve the patients ability to perform ADLs with improved efficiency    8 min Manual Therapy:  Gentle STM rectus and vastus lateralis left in knee flexion stretch   The manual therapy interventions were performed at a separate and distinct time from the therapeutic activities interventions. Rationale: increase ROM and increase tissue extensibility to improve patellar mobility and ease of knee flexion with ADLs           With   [] TE   [] TA   [] neuro   [] other: Patient Education: [x] Review HEP    [] Progressed/Changed HEP based on:   [] positioning   [] body mechanics   [] transfers   [] heat/ice application    [] other:      Other Objective/Functional Measures:      Pain Level (0-10 scale) post treatment: 0    ASSESSMENT/Changes in Function: Focused mostly on mobility today due to late arrival and complaints of stiffness and some increased aching through left knee today. Pt demonstrates improved AROM from last week. Will continue to gradually progress quad and hamstring strength as able for improved closed chain activity ease    Patient will continue to benefit from skilled PT services to modify and progress therapeutic interventions, address functional mobility deficits, address ROM deficits, address strength deficits, analyze and address soft tissue restrictions, analyze and cue movement patterns and analyze and modify body mechanics/ergonomics to attain remaining goals.      []  See Plan of Care  []  See progress note/recertification  []  See Discharge Summary         Progress towards goals / Updated goals:  Short Term Goals: To be accomplished in 2 weeks:  1. Pt will demonstrate I and compliance with HEP to maximize therapeutic effect.              OE: HEP issued and instructed              THMAIOSL: pt reports compliance to date 4/27/2022  2. Pt will demonstrate left knee AROM 0-110 deg for improved ease of dressing.              IE: 1-100              Current: progressing 0-105 deg 5/3/2022 Met 0-112 deg 5/10/2022  Long Term Goals: To be accomplished in 4 weeks:  1. Pt will demonstrate left knee AROM 0-120 deg for improved ease of transfers.              IE: 1-100   Current: progressing 0-112 deg 5/10/2022  2. Pt will demonstrate 15 SLR without quad lag or pain to improve quad strength in standing bilaterally.              IE: challenged with SLR  3. Pt will negotiate 4 stairs x 3 with single hand rail void of compensation or instability to improve home negotiation ease.              IE: pt with difficulty negotiating stairs   4.  Pt will improve FOTO score to predicted outcome to demonstrate improved quality of life and function.              IE: FOTO site down    PLAN  []  Upgrade activities as tolerated     []  Continue plan of care  []  Update interventions per flow sheet       []  Discharge due to:_  []  Other:_      Tonyfo January DPT CMTPT 5/10/2022  5:30 PM    Future Appointments   Date Time Provider Daryl Tanner   5/12/2022  6:00 PM Antony, [address] [place] [address]   5/17/2022  5:15 PM Pao Angulo, [address] [facility]   5/19/2022  5:15 PM [facility]

## 2022-05-12 ENCOUNTER — HOSPITAL ENCOUNTER (OUTPATIENT)
Dept: PHYSICAL THERAPY | Age: 72
Discharge: HOME | End: 2022-05-12
Payer: MEDICARE

## 2022-05-12 PROCEDURE — 97140 MANUAL THERAPY 1/> REGIONS: CPT

## 2022-05-12 PROCEDURE — 97112 NEUROMUSCULAR REEDUCATION: CPT

## 2022-05-12 PROCEDURE — 97110 THERAPEUTIC EXERCISES: CPT

## 2022-05-12 NOTE — PROGRESS NOTES
PT DAILY TREATMENT NOTE     Patient Name: Keisha Bolivar  Date:2022  : 1950  [x]  Patient  Verified  Payor: VA MEDICARE / Plan: VA MEDICARE PART A & B / Product Type: Medicare /    In time:6:04  Out time:6:58  Total Treatment Time (min): 54  Visit #: 6 of 8    Medicare/BCBS Only   Total Timed Codes (min):  46 1:1 Treatment Time:  55       Treatment Area: Bilateral primary osteoarthritis of first carpometacarpal joints [M18.0]    SUBJECTIVE  Pain Level (0-10 scale): 4  Any medication changes, allergies to medications, adverse drug reactions, diagnosis change, or new procedure performed?: [x] No    [] Yes (see summary sheet for update)  Subjective functional status/changes:   [] No changes reported  \"Feel like they don't belong to me\"  Pt reports some aching and discomfort in her knees, feels it may be the weather    OBJECTIVE    Modality rationale: decrease inflammation and decrease pain to improve the patients ability to perform ADLs   Min Type Additional Details    [] Estim:  []Unatt       []IFC  []Premod                        []Other:  []w/ice   []w/heat  Position:  Location:    [] Estim: []Att    []TENS instruct  []NMES                    []Other:  []w/US   []w/ice   []w/heat  Position:  Location:    []  Traction: [] Cervical       []Lumbar                       [] Prone          []Supine                       []Intermittent   []Continuous Lbs:  [] before manual  [] after manual    []  Ultrasound: []Continuous   [] Pulsed                           []1MHz   []3MHz W/cm2:  Location:    []  Iontophoresis with dexamethasone         Location: [] Take home patch   [] In clinic   10 [x]  Ice     []  heat  []  Ice massage  []  Laser   []  Anodyne Position: supine with wedge  Location: B knees    []  Laser with stim  []  Other:  Position:  Location:    []  Vasopneumatic Device    []  Right     []  Left  Pre-treatment girth:  Post-treatment girth:  Measured at (location):  Pressure:       [] lo [] med [] hi   Temperature: [] lo [] med [] hi   [] Skin assessment post-treatment:  []intact []redness- no adverse reaction    []redness - adverse reaction:     28 min Therapeutic Exercise:  [] See flow sheet :   Rationale: increase ROM and increase strength to improve the patients ability to perform daily tasks      10 min Neuromuscular Re-education:  []  See flow sheet :   Rationale: increase ROM, increase strength and increase proprioception  to improve the patients ability to perform ADLs with improved proprioception and quad activation    8 min Manual Therapy:  Gentle STM B quads, tib-fem mobs grade II extension   The manual therapy interventions were performed at a separate and distinct time from the therapeutic activities interventions. Rationale: increase ROM and increase tissue extensibility to improve ease of self care         With   [] TE   [] TA   [] neuro   [] other: Patient Education: [x] Review HEP    [] Progressed/Changed HEP based on:   [] positioning   [] body mechanics   [] transfers   [] heat/ice application    [] other:      Other Objective/Functional Measures:      Pain Level (0-10 scale) post treatment: 0    ASSESSMENT/Changes in Function: Pt a bit challenged this week with soreness/stiffness in her knees. Good response to exercise though, reports some quad fatigue but not painful. Progress gradually with strength. PROM good with manual today. Patient will continue to benefit from skilled PT services to modify and progress therapeutic interventions, address functional mobility deficits, address ROM deficits, address strength deficits, analyze and address soft tissue restrictions, analyze and cue movement patterns and analyze and modify body mechanics/ergonomics to attain remaining goals. []  See Plan of Care  []  See progress note/recertification  []  See Discharge Summary         Progress towards goals / Updated goals:  Short Term Goals: To be accomplished in 2 weeks:  1.  Pt will demonstrate I and compliance with HEP to maximize therapeutic effect.              XK: HEP issued and instructed              BTNYTFP: pt reports compliance to date 4/27/2022  2. Pt will demonstrate left knee AROM 0-110 deg for improved ease of dressing.              IE: 1-100              Current: progressing 0-105 deg 5/3/2022 Met 0-112 deg 5/10/2022  Long Term Goals: To be accomplished in 4 weeks:  1. Pt will demonstrate left knee AROM 0-120 deg for improved ease of transfers.              IE: 1-100              Current: progressing 0-112 deg 5/10/2022  2. Pt will demonstrate 15 SLR without quad lag or pain to improve quad strength in standing bilaterally.              IE: challenged with SLR   Current: progressing- 10 reps without quad lag 5/12/2022  3. Pt will negotiate 4 stairs x 3 with single hand rail void of compensation or instability to improve home negotiation ease.              IE: pt with difficulty negotiating stairs   4.  Pt will improve FOTO score to predicted outcome to demonstrate improved quality of life and function.              IE: FOTO site down    PLAN  [x]  Upgrade activities as tolerated     []  Continue plan of care  []  Update interventions per flow sheet       []  Discharge due to:_  []  Other:_      Kofi Dennis DPT CMTPT 5/12/2022  6:14 PM    Future Appointments   Date Time Provider Daryl Tanner   5/17/2022  5:15 PM Victoriano Rodriguez, [address] [facility]   5/19/2022  5:15 PM Victoriano Rodriguez, [address] [facility]

## 2022-05-17 ENCOUNTER — HOSPITAL ENCOUNTER (OUTPATIENT)
Dept: PHYSICAL THERAPY | Age: 72
Discharge: HOME | End: 2022-05-17
Payer: MEDICARE

## 2022-05-17 PROCEDURE — 97140 MANUAL THERAPY 1/> REGIONS: CPT

## 2022-05-17 PROCEDURE — 97112 NEUROMUSCULAR REEDUCATION: CPT

## 2022-05-17 PROCEDURE — 97110 THERAPEUTIC EXERCISES: CPT

## 2022-05-17 NOTE — PROGRESS NOTES
PT DAILY TREATMENT NOTE     Patient Name: Karuna Poole  Date:2022  : 1950  [x]  Patient  Verified  Payor: [place] MEDICARE / Plan: [place] MEDICARE PART A & B / Product Type: Medicare /    In time:5:12  Out time:6:05  Total Treatment Time (min): 53  Visit #: 7 of 8    Medicare/BCBS Only   Total Timed Codes (min):  43 1:1 Treatment Time:  43       Treatment Area: Bilateral primary osteoarthritis of first carpometacarpal joints [M18.0]    SUBJECTIVE  Pain Level (0-10 scale): 8  Any medication changes, allergies to medications, adverse drug reactions, diagnosis change, or new procedure performed?: [x] No    [] Yes (see summary sheet for update)  Subjective functional status/changes:   [] No changes reported  \"I'm hurting today.  It was swollen too\"    OBJECTIVE    Modality rationale: decrease inflammation and decrease pain to improve the patients ability to perform ADLs   Min Type Additional Details    [] Estim:  []Unatt       []IFC  []Premod                        []Other:  []w/ice   []w/heat  Position:  Location:    [] Estim: []Att    []TENS instruct  []NMES                    []Other:  []w/US   []w/ice   []w/heat  Position:  Location:    []  Traction: [] Cervical       []Lumbar                       [] Prone          []Supine                       []Intermittent   []Continuous Lbs:  [] before manual  [] after manual    []  Ultrasound: []Continuous   [] Pulsed                           []1MHz   []3MHz W/cm2:  Location:    []  Iontophoresis with dexamethasone         Location: [] Take home patch   [] In clinic   10 [x]  Ice     []  heat  []  Ice massage  []  Laser   []  Anodyne Position: supine with wedge  Location: B knees    []  Laser with stim  []  Other:  Position:  Location:    []  Vasopneumatic Device    []  Right     []  Left  Pre-treatment girth:  Post-treatment girth:  Measured at (location):  Pressure:       [] lo [] med [] hi   Temperature: [] lo [] med [] hi   [] Skin assessment post-treatment:  []intact []redness- no adverse reaction    []redness - adverse reaction:       27 min Therapeutic Exercise:  [] See flow sheet :   Rationale: increase ROM and increase strength to improve the patients ability to perform daily tasks      8 min Neuromuscular Re-education:  []  See flow sheet :   Rationale: increase strength and increase proprioception  to improve the patients ability to perform functional tasks with improved quad activation and proprioception    8 min Manual Therapy:  Passive knee flexion left knee, STM popliteal space with gentle extension overpressure grade II   The manual therapy interventions were performed at a separate and distinct time from the therapeutic activities interventions. Rationale: increase ROM and increase tissue extensibility to improve ease of ADLs            With   [] TE   [] TA   [] neuro   [] other: Patient Education: [x] Review HEP    [] Progressed/Changed HEP based on:   [] positioning   [] body mechanics   [] transfers   [] heat/ice application    [] other:      Other Objective/Functional Measures:      Pain Level (0-10 scale) post treatment: 0    ASSESSMENT/Changes in Function: The pt reports some increased pain and reported swelling over the last 2 days. She has demonstrated improved ROM and overall function up until today. Will assess symptoms at next visit, if still difficulty with pain will discuss return to MD    Patient will continue to benefit from skilled PT services to modify and progress therapeutic interventions, address functional mobility deficits, address ROM deficits, address strength deficits, analyze and address soft tissue restrictions, analyze and cue movement patterns and analyze and modify body mechanics/ergonomics to attain remaining goals. []  See Plan of Care  []  See progress note/recertification  []  See Discharge Summary         Progress towards goals / Updated goals:  Short Term Goals: To be accomplished in 2 weeks:  1.  Pt will demonstrate I and compliance with HEP to maximize therapeutic effect.              IQ: HEP issued and instructed              DKYVYAJ: pt reports compliance to date 4/27/2022  2. Pt will demonstrate left knee AROM 0-110 deg for improved ease of dressing.              IE: 1-100              Current: progressing 0-105 deg 5/3/2022 Met 0-112 deg 5/10/2022  Long Term Goals: To be accomplished in 4 weeks:  1. Pt will demonstrate left knee AROM 0-120 deg for improved ease of transfers.              IE: 1-100              Current: progressing 0-112 deg 5/10/2022  2. Pt will demonstrate 15 SLR without quad lag or pain to improve quad strength in standing bilaterally.              IE: challenged with SLR              Current: progressing- 10 reps without quad lag 5/12/2022  3. Pt will negotiate 4 stairs x 3 with single hand rail void of compensation or instability to improve home negotiation ease.              IE: pt with difficulty negotiating stairs   4.  Pt will improve FOTO score to predicted outcome to demonstrate improved quality of life and function.              IE: FOTO site down    PLAN  []  Upgrade activities as tolerated     []  Continue plan of care  []  Update interventions per flow sheet       []  Discharge due to:_  []  Other:_      Lizzy Persaud DPT CMTPT 5/17/2022  5:20 PM    Future Appointments   Date Time Provider Daryl Tanner   5/19/2022  5:15 PM Yessica Borrego, [address] [facility]

## 2022-06-08 ENCOUNTER — HOSPITAL ENCOUNTER (OUTPATIENT)
Dept: PHYSICAL THERAPY | Age: 72
Discharge: HOME | End: 2022-06-08
Payer: MEDICARE

## 2022-06-08 PROCEDURE — 97110 THERAPEUTIC EXERCISES: CPT

## 2022-06-08 PROCEDURE — 97112 NEUROMUSCULAR REEDUCATION: CPT

## 2022-06-08 PROCEDURE — 97140 MANUAL THERAPY 1/> REGIONS: CPT

## 2022-06-08 NOTE — PROGRESS NOTES
[facility]   Camilla Reddy [address]  [place], [address]  [phone] fax    Continued Plan of Care/ Re-certification for Physical Therapy Services    Patient name: Wandalee Gitelman Start of Care: 2022   Referral source: Isidra MARAVILLA NP : 1950               Medical Diagnosis: Bilateral primary osteoarthritis of first carpometacarpal joints [M18.0]  Payor: [place] MEDICARE / Plan: VA MEDICARE PART A & B / Product Type: Medicare /  Onset Date:2022               Treatment Diagnosis: B knee pain   Prior Hospitalization: see medical history Provider#: [id number]   Medications: Verified on Patient summary List    Comorbidities: none reported   Prior Level of Function: Pt with improved ambulation and ADL performance with occasional gym participation  Visits from Start of Care: 8    Missed Visits: 1    The Plan of Care and following information is based on the patient's current status:    Key functional changes: improved right knee pain and left knee ROM  Short Term Goals: To be accomplished in 2 weeks:  1. Pt will demonstrate I and compliance with HEP to maximize therapeutic effect.              VY: HEP issued and instructed              RRBFXLJ: pt reports compliance to date 2022  2. Pt will demonstrate left knee AROM 0-110 deg for improved ease of dressing.              IE: 1-100              Current: progressing 0-105 deg 5/3/2022 Met 0-112 deg 5/10/2022  Long Term Goals: To be accomplished in 4 weeks:  1. Pt will demonstrate left knee AROM 0-120 deg for improved ease of transfers.              IE: 1-100              Current: progressing 0-112 deg 5/10/2022 slow progress 0-115 deg 2022  2.  Pt will demonstrate 15 SLR without quad lag or pain to improve quad strength in standing bilaterally.              IE: challenged with SLR              Current: progressing- 10 reps without quad lag 2022; progressing 12 reps without lag 6/8/2022  3. Pt will negotiate 4 stairs x 3 with single hand rail void of compensation or instability to improve home negotiation ease.              IE: pt with difficulty negotiating stairs               Current: able to perform without gross instability but does report compensating at home due to weakness on left 6/8/2022  4. Pt will improve FOTO score to predicted outcome to demonstrate improved quality of life and function.              IE: FOTO site down              Current: progressed to 38 6/8/2022      Problems/ barriers to goal attainment: pain     Problem List: pain affecting function, decrease ROM, decrease strength, edema affecting function, impaired gait/ balance, decrease ADL/ functional abilitiies, decrease activity tolerance and decrease flexibility/ joint mobility    Treatment Plan: Therapeutic exercise, Therapeutic activities, Neuromuscular re-education, Physical agent/modality, Gait/balance training, Manual therapy, Patient education, Self Care training, Functional mobility training and Stair training     Patient Goal (s) has been updated and includes: \"get rid of this all the way\"     Goals for this certification period to be accomplished in 4 weeks:  1. Pt will demonstrate left knee AROM 0-120 deg for improved ease of transfers. PN: progressing 0-115 deg   2. Pt will demonstrate 15 SLR without quad lag or pain to improve quad strength in standing bilaterally. PN: progressing 12 reps without lag   3. Pt will negotiate 4 stairs x 3 with single hand rail void of compensation or instability to improve home negotiation ease. PN: able to perform without gross instability but does report compensating at home due to weakness on left   4. Pt will improve FOTO score to predicted outcome to demonstrate improved quality of life and function. PN: progressed to 38    Frequency / Duration: Patient to be seen 2 times per week for 4 weeks:    Assessment / Recommendations: The pt demonstrates progression in knee ROM and denies right knee pain of late. She is limited with functional mechanics and overall activity participation due to reported knee pain and swelling following standing activity. She would benefit from continuation of PT to further progress knee ROM and strength to improve ease of ADLs    Certification Period: 6/8/2022 to 7/7/2022    Chet He DPT CMTPT 6/8/2022 7:44 AM    ________________________________________________________________________  I certify that the above Therapy Services are being furnished while the patient is under my care. I agree with the treatment plan and certify that this therapy is necessary. [] I have read the above and request that my patient continue as recommended.   [] I have read the above report and request that my patient continue therapy with the following changes/special instructions: _____________________________________________  [] I have read the above report and request that my patient be discharged from therapy    Physician's Signature:____________Date:_________TIME:________     Chepe Allen NP  ** Signature, Date and Time must be completed for valid certification **    Please sign and return to [facility] [address] [place] [address] & [facility]  [address] [facility] Danika Reddy [address] [place] [address], [facility] [address]  [phone] fax

## 2022-06-08 NOTE — PROGRESS NOTES
PT DAILY TREATMENT NOTE     Patient Name: Joel Barr  Date:2022  : 1950  [x]  Patient  Verified   Payor: VA MEDICARE / Plan: VA MEDICARE PART A & B / Product Type: Medicare /    In time:7:01  Out time:7:49  Total Treatment Time (min): 48  Visit #: 8 of 8    Medicare/BCBS Only   Total Timed Codes (min):  38 1:1 Treatment Time:  38       Treatment Area: Bilateral primary osteoarthritis of first carpometacarpal joints [M18.0]    SUBJECTIVE  Pain Level (0-10 scale): 2  Any medication changes, allergies to medications, adverse drug reactions, diagnosis change, or new procedure performed?: [x] No    [] Yes (see summary sheet for update)  Subjective functional status/changes:   [] No changes reported  The pt presents after lay off from PT secondary to schedule availability. She reports managing fairly well over that time utilizing HEP.  She does report some continued intermittent swelling of left knee    OBJECTIVE    Modality rationale: decrease inflammation and decrease pain to improve the patients ability to perform ADLs   Min Type Additional Details    [] Estim:  []Unatt       []IFC  []Premod                        []Other:  []w/ice   []w/heat  Position:  Location:    [] Estim: []Att    []TENS instruct  []NMES                    []Other:  []w/US   []w/ice   []w/heat  Position:  Location:    []  Traction: [] Cervical       []Lumbar                       [] Prone          []Supine                       []Intermittent   []Continuous Lbs:  [] before manual  [] after manual    []  Ultrasound: []Continuous   [] Pulsed                           []1MHz   []3MHz W/cm2:  Location:    []  Iontophoresis with dexamethasone         Location: [] Take home patch   [] In clinic   10 [x]  Ice     []  heat  []  Ice massage  []  Laser   []  Anodyne Position: supine with wedge  Location: left knee    []  Laser with stim  []  Other:  Position:  Location:    []  Vasopneumatic Device    []  Right     [] Left  Pre-treatment girth:  Post-treatment girth:  Measured at (location):  Pressure:       [] lo [] med [] hi   Temperature: [] lo [] med [] hi   [] Skin assessment post-treatment:  []intact []redness- no adverse reaction    []redness - adverse reaction:         22 min Therapeutic Exercise:  [] See flow sheet :   Rationale: increase ROM and increase strength to improve the patients ability to perform daily tasks      8 min Neuromuscular Re-education:  []  See flow sheet :   Rationale: increase strength and increase proprioception  to improve the patients ability to perform functional tasks with improved mechanics and stability    8 min Manual Therapy:  Left knee patellar mobs medial focus, passive flexion and extension over pressure to comfort   The manual therapy interventions were performed at a separate and distinct time from the therapeutic activities interventions. Rationale: decrease pain, increase ROM and increase tissue extensibility to improve ease of gait            With   [] TE   [] TA   [] neuro   [] other: Patient Education: [x] Review HEP    [] Progressed/Changed HEP based on:   [] positioning   [] body mechanics   [] transfers   [] heat/ice application    [] other:      Other Objective/Functional Measures:      Pain Level (0-10 scale) post treatment: 0    ASSESSMENT/Changes in Function: The pt demonstrates progression in knee ROM and denies right knee pain of late. She is limited with functional mechanics and overall activity participation due to reported knee pain and swelling following standing activity.  She would benefit from continuation of PT to further progress knee ROM and strength to improve ease of ADLs    Patient will continue to benefit from skilled PT services to modify and progress therapeutic interventions, address functional mobility deficits, address ROM deficits, address strength deficits, analyze and address soft tissue restrictions, analyze and cue movement patterns and analyze and modify body mechanics/ergonomics to attain remaining goals. []  See Plan of Care  [x]  See progress note/recertification  []  See Discharge Summary         Progress towards goals / Updated goals:  Short Term Goals: To be accomplished in 2 weeks:  1. Pt will demonstrate I and compliance with HEP to maximize therapeutic effect.              XE: HEP issued and instructed              DCTCZEB: pt reports compliance to date 4/27/2022  2. Pt will demonstrate left knee AROM 0-110 deg for improved ease of dressing.              IE: 1-100              Current: progressing 0-105 deg 5/3/2022 Met 0-112 deg 5/10/2022  Long Term Goals: To be accomplished in 4 weeks:  1. Pt will demonstrate left knee AROM 0-120 deg for improved ease of transfers.              IE: 1-100              Current: progressing 0-112 deg 5/10/2022 slow progress 0-115 deg 6/8/2022  2. Pt will demonstrate 15 SLR without quad lag or pain to improve quad strength in standing bilaterally.              IE: challenged with SLR              Current: progressing- 10 reps without quad lag 5/12/2022; progressing 12 reps without lag 6/8/2022  3. Pt will negotiate 4 stairs x 3 with single hand rail void of compensation or instability to improve home negotiation ease.              IE: pt with difficulty negotiating stairs    Current: able to perform without gross instability but does report compensating at home due to weakness on left 6/8/2022  4. Pt will improve FOTO score to predicted outcome to demonstrate improved quality of life and function.              IE: FOTO site down   Current: progressed to 38 6/8/2022    PLAN  [x]  Upgrade activities as tolerated     []  Continue plan of care  []  Update interventions per flow sheet       []  Discharge due to:_  []  Other:_      Myrna Schultz DPT CMTPT 6/8/2022  7:03 AM    No future appointments.

## 2022-06-17 ENCOUNTER — APPOINTMENT (OUTPATIENT)
Dept: PHYSICAL THERAPY | Age: 72
End: 2022-06-17
Payer: MEDICARE

## 2022-06-21 ENCOUNTER — HOSPITAL ENCOUNTER (OUTPATIENT)
Dept: PHYSICAL THERAPY | Age: 72
Discharge: HOME | End: 2022-06-21
Payer: MEDICARE

## 2022-06-21 PROCEDURE — 97110 THERAPEUTIC EXERCISES: CPT

## 2022-06-21 PROCEDURE — 97140 MANUAL THERAPY 1/> REGIONS: CPT

## 2022-06-21 PROCEDURE — 97112 NEUROMUSCULAR REEDUCATION: CPT

## 2022-06-21 NOTE — PROGRESS NOTES
PT DAILY TREATMENT NOTE     Patient Name: Joel Barr  Date:2022  : 1950  [x]  Patient  Verified  Payor: VA MEDICARE / Plan: VA MEDICARE PART A & B / Product Type: Medicare /    In time:3:01  Out time:3:49  Total Treatment Time (min): 48  Visit #: 1 of 8    Medicare/BCBS Only   Total Timed Codes (min):  38 1:1 Treatment Time:  38       Treatment Area: Bilateral primary osteoarthritis of first carpometacarpal joints [M18.0]    SUBJECTIVE  Pain Level (0-10 scale): 3  Any medication changes, allergies to medications, adverse drug reactions, diagnosis change, or new procedure performed?: [x] No    [] Yes (see summary sheet for update)  Subjective functional status/changes:   [] No changes reported  \"I'm learning to manage. The days are good when I'm moving about.  I just use the voltaren and do the exercises at night if I need\"    OBJECTIVE    Modality rationale: decrease inflammation and decrease pain to improve the patients ability to perform ADLs   Min Type Additional Details    [] Estim:  []Unatt       []IFC  []Premod                        []Other:  []w/ice   []w/heat  Position:  Location:    [] Estim: []Att    []TENS instruct  []NMES                    []Other:  []w/US   []w/ice   []w/heat  Position:  Location:    []  Traction: [] Cervical       []Lumbar                       [] Prone          []Supine                       []Intermittent   []Continuous Lbs:  [] before manual  [] after manual    []  Ultrasound: []Continuous   [] Pulsed                           []1MHz   []3MHz W/cm2:  Location:    []  Iontophoresis with dexamethasone         Location: [] Take home patch   [] In clinic   10 [x]  Ice     []  heat  []  Ice massage  []  Laser   []  Anodyne Position:supine with wedge  Location: left knee    []  Laser with stim  []  Other:  Position:  Location:    []  Vasopneumatic Device    []  Right     []  Left  Pre-treatment girth:  Post-treatment girth:  Measured at (location):  Pressure: [] lo [] med [] hi   Temperature: [] lo [] med [] hi   [] Skin assessment post-treatment:  []intact []redness- no adverse reaction    []redness - adverse reaction:       22 min Therapeutic Exercise:  [] See flow sheet :   Rationale: increase ROM and increase strength to improve the patients ability to perform daily tasks      8 min Neuromuscular Re-education:  []  See flow sheet :   Rationale: increase strength, improve coordination and increase proprioception  to improve the patients ability to perform ADLs with improved stability    8 min Manual Therapy:  Left patellar mobs and knee flexion stretching with gentle overpressure   The manual therapy interventions were performed at a separate and distinct time from the therapeutic activities interventions. Rationale: increase ROM and increase tissue extensibility to improve ease of ambulation            With   [] TE   [] TA   [] neuro   [] other: Patient Education: [x] Review HEP    [] Progressed/Changed HEP based on:   [] positioning   [] body mechanics   [] transfers   [] heat/ice application    [] other:      Other Objective/Functional Measures: 0-125 deg left knee AROM   Pain Level (0-10 scale) post treatment: 0    ASSESSMENT/Changes in Function: The pt demonstrates good response to therapy with improved tolerance and energy level. She demonstrates improved knee AROM on left meeting goal. Able to add shuttle press and progress some strength interventions today with good response. Patient will continue to benefit from skilled PT services to modify and progress therapeutic interventions, address functional mobility deficits, address ROM deficits, address strength deficits, analyze and address soft tissue restrictions, analyze and cue movement patterns and analyze and modify body mechanics/ergonomics to attain remaining goals.      []  See Plan of Care  []  See progress note/recertification  []  See Discharge Summary         Progress towards goals / Updated goals:  Goals for this certification period to be accomplished in 4 weeks:  1. Pt will demonstrate left knee AROM 0-120 deg for improved ease of transfers. PN: progressing 0-115 deg   Current: Met 125 deg 6/21/2022  2. Pt will demonstrate 15 SLR without quad lag or pain to improve quad strength in standing bilaterally. PN: progressing 12 reps without lag   3. Pt will negotiate 4 stairs x 3 with single hand rail void of compensation or instability to improve home negotiation ease. PN: able to perform without gross instability but does report compensating at home due to weakness on left   4. Pt will improve FOTO score to predicted outcome to demonstrate improved quality of life and function.   PN: progressed to 45    PLAN  [x]  Upgrade activities as tolerated     []  Continue plan of care  []  Update interventions per flow sheet       []  Discharge due to:_  []  Other:_      Mohsen Seymour DPT CMTPT 6/21/2022  3:11 PM    Future Appointments   Date Time Provider Daryl Tanner   6/23/2022  3:00 PM Antony, [address] [facility]   6/28/2022  3:00 PM Antony, [address] [facility]   6[facility]30[facility]20[facility]   7/5/2022  9:45 AM [facility]   7/7/2022 10:30 AM [facility]   7/12/2022  9:45 AM Corbin Cooley [facility]     d

## 2022-06-23 ENCOUNTER — HOSPITAL ENCOUNTER (OUTPATIENT)
Dept: PHYSICAL THERAPY | Age: 72
Discharge: HOME | End: 2022-06-23
Payer: MEDICARE

## 2022-06-23 PROCEDURE — 97110 THERAPEUTIC EXERCISES: CPT

## 2022-06-23 PROCEDURE — 97112 NEUROMUSCULAR REEDUCATION: CPT

## 2022-06-23 NOTE — PROGRESS NOTES
PT DAILY TREATMENT NOTE     Patient Name: Jonas Ch  Date:2022  : 1950  [x]  Patient  Verified  Payor: [place] MED[place]ARE / Plan: [place] MEDICARE PART A & B / Product Type: Medicare /    In time:6:11  Out time:6:52  Total Treatment Time (min): 41  Visit #: 2 of 8    Medicare/BCBS Only   Total Timed Codes (min):  31 1:1 Treatment Time:  31       Treatment Area: Bilateral primary osteoarthritis of first carpometacarpal joints [M18.0]    SUBJECTIVE  Pain Level (0-10 scale): 0  Any medication changes, allergies to medications, adverse drug reactions, diagnosis change, or new procedure performed?: [x] No    [] Yes (see summary sheet for update)  Subjective functional status/changes:   [] No changes reported  The pt reports that she started using BioFreeze last night and did not have any pain when going to sleep.  Denies pain entering clinic today    OBJECTIVE    Modality rationale: decrease inflammation and decrease pain to improve the patients ability to perform ADLs   Min Type Additional Details    [] Estim:  []Unatt       []IFC  []Premod                        []Other:  []w/ice   []w/heat  Position:  Location:    [] Estim: []Att    []TENS instruct  []NMES                    []Other:  []w/US   []w/ice   []w/heat  Position:  Location:    []  Traction: [] Cervical       []Lumbar                       [] Prone          []Supine                       []Intermittent   []Continuous Lbs:  [] before manual  [] after manual    []  Ultrasound: []Continuous   [] Pulsed                           []1MHz   []3MHz W/cm2:  Location:    []  Iontophoresis with dexamethasone         Location: [] Take home patch   [] In clinic   10 [x]  Ice     []  heat  []  Ice massage  []  Laser   []  Anodyne Position: supine with wedge  Location: left knee    []  Laser with stim  []  Other:  Position:  Location:    []  Vasopneumatic Device    []  Right     []  Left  Pre-treatment girth:  Post-treatment girth:  Measured at (location): Pressure:       [] lo [] med [] hi   Temperature: [] lo [] med [] hi   [] Skin assessment post-treatment:  []intact []redness- no adverse reaction    []redness - adverse reaction:       23 min Therapeutic Exercise:  [] See flow sheet :   Rationale: increase ROM and increase strength to improve the patients ability to perform daily tasks     8 min Neuromuscular Re-education:  []  See flow sheet :   Rationale: increase strength and increase proprioception  to improve the patients ability to improve ease of ADLs with increased stability          With   [] TE   [] TA   [] neuro   [] other: Patient Education: [x] Review HEP    [] Progressed/Changed HEP based on:   [] positioning   [] body mechanics   [] transfers   [] heat/ice application    [] other:      Other Objective/Functional Measures: pt challenged a bit with step ups on left LE reporting a bit of patellar region discomfort   Pain Level (0-10 scale) post treatment: 0    ASSESSMENT/Changes in Function: The pt demonstrates good pain control and reports good pain control last night for a change. She does have good response to exercise without compensation. Plan to progress some load tolerance through LE musculature as able moving forward. Patient will continue to benefit from skilled PT services to modify and progress therapeutic interventions, address functional mobility deficits, address ROM deficits, address strength deficits, analyze and address soft tissue restrictions, analyze and cue movement patterns and analyze and modify body mechanics/ergonomics to attain remaining goals. []  See Plan of Care  []  See progress note/recertification  []  See Discharge Summary         Progress towards goals / Updated goals:  Goals for this certification period to be accomplished in 4 weeks:  1. Pt will demonstrate left knee AROM 0-120 deg for improved ease of transfers. PN: progressing 0-115 deg   Current: Met 125 deg 6/21/2022  2.  Pt will demonstrate 15 SLR without quad lag or pain to improve quad strength in standing bilaterally. PN: progressing 12 reps without lag   3. Pt will negotiate 4 stairs x 3 with single hand rail void of compensation or instability to improve home negotiation ease. PN: able to perform without gross instability but does report compensating at home due to weakness on left   4. Pt will improve FOTO score to predicted outcome to demonstrate improved quality of life and function.   PN: progressed TW 28    PLAN  []  Upgrade activities as tolerated     []  Continue plan of care  []  Update interventions per flow sheet       []  Discharge due to:_  []  Other:_      Kofi Dennis DPT CMTPT 6/23/2022  6:13 PM    Future Appointments   Date Time Provider Daryl Tanner   6/28/2022  3:00 PM Antony, [address] [facility]   6/30/2022 11:15 AM Antony, [address] [facility]   7/5/2022  9:45 [facility]   7/7/2022 10:30 AM [facility]   7/12/2022  9:45 AM [facility]

## 2022-06-28 ENCOUNTER — HOSPITAL ENCOUNTER (OUTPATIENT)
Dept: PHYSICAL THERAPY | Age: 72
Discharge: HOME | End: 2022-06-28
Payer: MEDICARE

## 2022-06-28 PROCEDURE — 97140 MANUAL THERAPY 1/> REGIONS: CPT

## 2022-06-28 PROCEDURE — 97112 NEUROMUSCULAR REEDUCATION: CPT

## 2022-06-28 PROCEDURE — 97110 THERAPEUTIC EXERCISES: CPT

## 2022-06-28 NOTE — PROGRESS NOTES
PT DAILY TREATMENT NOTE     Patient Name: Sylvia Mercado  Date:2022  : 1950  [x]  Patient  Verified  Payor: [place] MEDICARE / Plan: [place] MEDICARE PART A & B / Product Type: Medicare /    In time:12:01  Out time:12:51  Total Treatment Time (min): 50  Visit #: 3 of 8    Medicare/BCBS Only   Total Timed Codes (min):  40 1:1 Treatment Time:  40       Treatment Area: Bilateral primary osteoarthritis of first carpometacarpal joints [M18.0]    SUBJECTIVE  Pain Level (0-10 scale): 2-3  Any medication changes, allergies to medications, adverse drug reactions, diagnosis change, or new procedure performed?: [x] No    [] Yes (see summary sheet for update)  Subjective functional status/changes:   [] No changes reported  \" I had a terrible night last night, it kept me up.  Just when I think I can manage\"    OBJECTIVE    Modality rationale: decrease inflammation and decrease pain to improve the patients ability to perform ADLs   Min Type Additional Details    [] Estim:  []Unatt       []IFC  []Premod                        []Other:  []w/ice   []w/heat  Position:  Location:    [] Estim: []Att    []TENS instruct  []NMES                    []Other:  []w/US   []w/ice   []w/heat  Position:  Location:    []  Traction: [] Cervical       []Lumbar                       [] Prone          []Supine                       []Intermittent   []Continuous Lbs:  [] before manual  [] after manual    []  Ultrasound: []Continuous   [] Pulsed                           []1MHz   []3MHz W/cm2:  Location:    []  Iontophoresis with dexamethasone         Location: [] Take home patch   [] In clinic   10 [x]  Ice     []  heat  []  Ice massage  []  Laser   []  Anodyne Position: supine with wedge  Location: left knee    []  Laser with stim  []  Other:  Position:  Location:    []  Vasopneumatic Device    []  Right     []  Left  Pre-treatment girth:  Post-treatment girth:  Measured at (location):  Pressure:       [] lo [] med [] hi   Temperature: [] lo [] med [] hi   [] Skin assessment post-treatment:  []intact []redness- no adverse reaction    []redness - adverse reaction:     22 min Therapeutic Exercise:  [] See flow sheet :   Rationale: increase ROM and increase strength to improve the patients ability to perform daily tasks      10 min Neuromuscular Re-education:  []  See flow sheet :   Rationale: increase ROM, increase strength and increase proprioception  to improve the patients ability to perform daily tasks with improved stability and efficiency    8 min Manual Therapy:  Left knee STM quads/ITB and hamstrings   The manual therapy interventions were performed at a separate and distinct time from the therapeutic activities interventions. Rationale: decrease pain, increase ROM and increase tissue extensibility to improve ease of ADLs         With   [] TE   [] TA   [] neuro   [] other: Patient Education: [x] Review HEP    [] Progressed/Changed HEP based on:   [] positioning   [] body mechanics   [] transfers   [] heat/ice application    [] other:      Other Objective/Functional Measures:      Pain Level (0-10 scale) post treatment: 0    ASSESSMENT/Changes in Function: The pt demonstrates good response overall to therex despite some soreness. She reports offloading left LE with WB'ing activity as able. Advised her to try to challenge this while in clinic as this is our chance to progress [address] ability as able or modify to improve ease of ADLs. Patient will continue to benefit from skilled PT services to modify and progress therapeutic interventions, address functional mobility deficits, address ROM deficits, address strength deficits, analyze and address soft tissue restrictions, analyze and cue movement patterns and analyze and modify body mechanics/ergonomics to attain remaining goals.      []  See Plan of Care  []  See progress note/recertification  []  See Discharge Summary         Progress towards goals / Updated goals:  Goals for this certification period to be accomplished in 4 weeks:  1. Pt will demonstrate left knee AROM 0-120 deg for improved ease of transfers. PN: progressing 0-115 deg   Current: Met 125 deg 6/21/2022  2. Pt will demonstrate 15 SLR without quad lag or pain to improve quad strength in standing bilaterally. PN: progressing 12 reps without lag   Current: near met 1 rest break briefly to achieve 15 reps 6/28/2022  3. Pt will negotiate 4 stairs x 3 with single hand rail void of compensation or instability to improve home negotiation ease. PN: able to perform without gross instability but does report compensating at home due to weakness on left   4. Pt will improve FOTO score to predicted outcome to demonstrate improved quality of life and function.   PN: progressed BC 24    PLAN  [x]  Upgrade activities as tolerated     []  Continue plan of care  []  Update interventions per flow sheet       []  Discharge due to:_  []  Other:_      Felipa Ocampo DPT CMTPT 6/28/2022  12:11 PM    Future Appointments   Date Time Provider Daryl Tanner   6/30/2022 11:15 AM Antony, [address] [facility]   7/5/2022  9:45 AM Antony, [address] [facility]   7/7/2022 10:30 AM Rashawn Rea [facility]   7/12/2022  9:45 AM Rashawn Rea [facility]

## 2022-06-30 ENCOUNTER — HOSPITAL ENCOUNTER (OUTPATIENT)
Dept: PHYSICAL THERAPY | Age: 72
Discharge: HOME | End: 2022-06-30
Payer: MEDICARE

## 2022-06-30 PROCEDURE — 97110 THERAPEUTIC EXERCISES: CPT

## 2022-06-30 PROCEDURE — 97112 NEUROMUSCULAR REEDUCATION: CPT

## 2022-06-30 NOTE — PROGRESS NOTES
PT DAILY TREATMENT NOTE     Patient Name: Sol Olp  Date:2022  : 1950  [x]  Patient  Verified  Payor: [place] MEDICARE / Plan: [place] MEDICARE PART A & B / Product Type: Medicare /    In time:11:22  Out time:12:06  Total Treatment Time (min): 44  Visit #: 4 of 8    Medicare/BCBS Only   Total Timed Codes (min):  34 1:1 Treatment Time:  34       Treatment Area: Bilateral primary osteoarthritis of first carpometacarpal joints [M18.0]    SUBJECTIVE  Pain Level (0-10 scale): 2  Any medication changes, allergies to medications, adverse drug reactions, diagnosis change, or new procedure performed?: [x] No    [] Yes (see summary sheet for update)  Subjective functional status/changes:   [] No changes reported  The pt reports she felt better after last session and was surprised she wasn't sore.  Still utilizing Aleve to help also    OBJECTIVE    Modality rationale: decrease inflammation and decrease pain to improve the patients ability to perform ADLs   Min Type Additional Details    [] Estim:  []Unatt       []IFC  []Premod                        []Other:  []w/ice   []w/heat  Position:  Location:    [] Estim: []Att    []TENS instruct  []NMES                    []Other:  []w/US   []w/ice   []w/heat  Position:  Location:    []  Traction: [] Cervical       []Lumbar                       [] Prone          []Supine                       []Intermittent   []Continuous Lbs:  [] before manual  [] after manual    []  Ultrasound: []Continuous   [] Pulsed                           []1MHz   []3MHz W/cm2:  Location:    []  Iontophoresis with dexamethasone         Location: [] Take home patch   [] In clinic   10 [x]  Ice     []  heat  []  Ice massage  []  Laser   []  Anodyne Position: supine with wedge  Location: left knee    []  Laser with stim  []  Other:  Position:  Location:    []  Vasopneumatic Device    []  Right     []  Left  Pre-treatment girth:  Post-treatment girth:  Measured at (location):  Pressure:       [] lo [] med [] hi   Temperature: [] lo [] med [] hi   [] Skin assessment post-treatment:  []intact []redness- no adverse reaction    []redness - adverse reaction:       24 min Therapeutic Exercise:  [] See flow sheet :   Rationale: increase ROM and increase strength to improve the patients ability to perform daily tasks      10 min Neuromuscular Re-education:  []  See flow sheet :   Rationale: increase strength, improve balance and increase proprioception  to improve the patients ability to perform ADLs with improved stability     With   [] TE   [] TA   [] neuro   [] other: Patient Education: [x] Review HEP    [] Progressed/Changed HEP based on:   [] positioning   [] body mechanics   [] transfers   [] heat/ice application    [] other:      Other Objective/Functional Measures:      Pain Level (0-10 scale) post treatment: 0    ASSESSMENT/Changes in Function: The pt tolerated progressed repetitions and resistance well today. Reiterated that PT is looking to improve muscle load tolerance in hopes of improving knee pain with ADL performance to improve patient willingness to progress exercise. Likely D/C to management program if limited progression subjectively    Patient will continue to benefit from skilled PT services to modify and progress therapeutic interventions, address functional mobility deficits, address ROM deficits, address strength deficits and analyze and address soft tissue restrictions to attain remaining goals. []  See Plan of Care  []  See progress note/recertification  []  See Discharge Summary         Progress towards goals / Updated goals:  Goals for this certification period to be accomplished in 4 weeks:  1. Pt will demonstrate left knee AROM 0-120 deg for improved ease of transfers. PN: progressing 0-115 deg   Current: Met 125 deg 6/21/2022  2. Pt will demonstrate 15 SLR without quad lag or pain to improve quad strength in standing bilaterally.   PN: progressing 12 reps without lag   Current: near met 1 rest break briefly to achieve 15 reps 6/28/2022  3. Pt will negotiate 4 stairs x 3 with single hand rail void of compensation or instability to improve home negotiation ease. PN: able to perform without gross instability but does report compensating at home due to weakness on left   Current: Met no compensation or instability, no increased discomfort 6/30/2022  4. Pt will improve FOTO score to predicted outcome to demonstrate improved quality of life and function.   PN: progressed DJ 25    PLAN  [x]  Upgrade activities as tolerated     []  Continue plan of care  []  Update interventions per flow sheet       []  Discharge due to:_  []  Other:_      Casey Alonzo DPT CMTPT 6/30/2022  11:23 AM    Future Appointments   Date Time Provider Daryl Tanner   7/5/2022  9:45 AM Antony, [employer] [facility]   7/7/2022 10:30 AM [place], [employer] [facility]   7/12/2022  9:45 AM [facility]

## 2022-07-05 ENCOUNTER — HOSPITAL ENCOUNTER (OUTPATIENT)
Dept: PHYSICAL THERAPY | Age: 72
Discharge: HOME | End: 2022-07-05
Payer: MEDICARE

## 2022-07-05 PROCEDURE — 97112 NEUROMUSCULAR REEDUCATION: CPT

## 2022-07-05 PROCEDURE — 97110 THERAPEUTIC EXERCISES: CPT

## 2022-07-05 NOTE — PROGRESS NOTES
PT DAILY TREATMENT NOTE     Patient Name: Jaya Colby  Date:2022  : 1950  [x]  Patient  Verified  Payor: VA MEDICARE / Plan: [place] MEDICARE PART A & B / Product Type: Medicare /    In time:9:59  Out time:10:32  Total Treatment Time (min): 33  Visit #: 5 of 8    Medicare/BCBS Only   Total Timed Codes (min):  23 1:1 Treatment Time:  23       Treatment Area: Bilateral primary osteoarthritis of first carpometacarpal joints [M18.0]    SUBJECTIVE  Pain Level (0-10 scale): 1  Any medication changes, allergies to medications, adverse drug reactions, diagnosis change, or new procedure performed?: [x] No    [] Yes (see summary sheet for update)  Subjective functional status/changes:   [] No changes reported  The pt reports she did have a fit with her knee two nights ago but had no pain last night    OBJECTIVE    Modality rationale: decrease inflammation and decrease pain to improve the patients ability to perform ADLs   Min Type Additional Details    [] Estim:  []Unatt       []IFC  []Premod                        []Other:  []w/ice   []w/heat  Position:  Location:    [] Estim: []Att    []TENS instruct  []NMES                    []Other:  []w/US   []w/ice   []w/heat  Position:  Location:    []  Traction: [] Cervical       []Lumbar                       [] Prone          []Supine                       []Intermittent   []Continuous Lbs:  [] before manual  [] after manual    []  Ultrasound: []Continuous   [] Pulsed                           []1MHz   []3MHz W/cm2:  Location:    []  Iontophoresis with dexamethasone         Location: [] Take home patch   [] In clinic   10 [x]  Ice     []  heat  []  Ice massage  []  Laser   []  Anodyne Position: supine with wedge  Location: left knee    []  Laser with stim  []  Other:  Position:  Location:    []  Vasopneumatic Device    []  Right     []  Left  Pre-treatment girth:  Post-treatment girth:  Measured at (location):  Pressure:       [] lo [] med [] hi   Temperature: [] lo [] med [] hi   [] Skin assessment post-treatment:  []intact []redness- no adverse reaction    []redness - adverse reaction:         15 min Therapeutic Exercise:  [] See flow sheet :   Rationale: increase ROM and increase strength to improve the patients ability to perform daily tasks     8 min Neuromuscular Re-education:  []  See flow sheet :   Rationale: increase strength and increase proprioception  to improve the patients ability to perform functional tasks with increased ease         With   [] TE   [] TA   [] neuro   [] other: Patient Education: [x] Review HEP    [] Progressed/Changed HEP based on:   [] positioning   [] body mechanics   [] transfers   [] heat/ice application    [] other:      Other Objective/Functional Measures:      Pain Level (0-10 scale) post treatment: 0    ASSESSMENT/Changes in Function: The pt demonstrates good overall tolerance to exercise aside from some crepitus reports with closed chain knee flexion on left. Pt continues to manage and modify with ADLs, demonstrating fairly good pain control and activity participation. She continues to seem reluctant to challenge left LE in clinic requiring encouragement from PT, will plan to D/C to HEP management at next visit. Patient will continue to benefit from skilled PT services to modify and progress therapeutic interventions, address functional mobility deficits, address ROM deficits, address strength deficits, analyze and address soft tissue restrictions, analyze and cue movement patterns and analyze and modify body mechanics/ergonomics to attain remaining goals. []  See Plan of Care  []  See progress note/recertification  []  See Discharge Summary         Progress towards goals / Updated goals:  Goals for this certification period to be accomplished in 4 weeks:  1. Pt will demonstrate left knee AROM 0-120 deg for improved ease of transfers. PN: progressing 0-115 deg   Current: Met 125 deg 6/21/2022  2.  Pt will demonstrate 15 SLR without quad lag or pain to improve quad strength in standing bilaterally. PN: progressing 12 reps without lag   Current: near met 1 rest break briefly to achieve 15 reps 6/28/2022  3. Pt will negotiate 4 stairs x 3 with single hand rail void of compensation or instability to improve home negotiation ease. PN: able to perform without gross instability but does report compensating at home due to weakness on left   Current: Met no compensation or instability, no increased discomfort 6/30/2022  4. Pt will improve FOTO score to predicted outcome to demonstrate improved quality of life and function.   PN: progressed ER 95  Current: progressed to 47 7/5/2022    PLAN  []  Upgrade activities as tolerated     [x]  Continue plan of care  []  Update interventions per flow sheet       []  Discharge due to:_  []  Other:_      Ambrosio Gilbert DPT CMTPT 7/5/2022  10:04 AM    Future Appointments   Date Time Provider Daryl Tanner   7/7/2022 10:30 AM Antony [address] [facility]   7/12/2022  9:45 AM [facility]

## 2022-07-07 ENCOUNTER — HOSPITAL ENCOUNTER (OUTPATIENT)
Dept: PHYSICAL THERAPY | Age: 72
Discharge: HOME | End: 2022-07-07
Payer: MEDICARE

## 2022-07-07 PROCEDURE — 97112 NEUROMUSCULAR REEDUCATION: CPT

## 2022-07-07 PROCEDURE — 97110 THERAPEUTIC EXERCISES: CPT

## 2022-07-07 NOTE — PROGRESS NOTES
PT DISCHARGE DAILY NOTE AND [id number]    Date:2022  Patient name: Cathie Archibald Start of Care: 2022   Referral source: Jesus Allen NP : 1950               Medical Diagnosis: Bilateral primary osteoarthritis of first carpometacarpal joints [M18.0]  Payor: Dorota Fry / Plan: [place] / Product Type: Medicare /  Onset Date:2022               Treatment Diagnosis: B knee pain   Prior Hospitalization: see medical history Provider#: [id number]   Medications: Verified on Patient summary List    Comorbidities: none reported   Prior Level of Function: Pt with improved ambulation and ADL performance with occasional gym participation  Visits from Start of Care: 14    Missed Visits: 1    Reporting Period : 2022 to 2022    [x]  Patient  Verified  Payor: Dorota Fry / Plan: [place] & [place] / Product Type: Medicare /    In time:10:38  Out time:11:17  Total Treatment Time (min): 39  Visit #: 6 of 8    Medicare/BCBS Only   Total Timed Codes (min):  29 1:1 Treatment Time:  29       SUBJECTIVE  Pain Level (0-10 scale): 1  Any medication changes, allergies to medications, adverse drug reactions, diagnosis change, or new procedure performed?: [x] No    [] Yes (see summary sheet for update)  Subjective functional status/changes:   [] No changes reported  \"I've been laid up around the house the last 2 days so its been feeling good\"    OBJECTIVE    Modality rationale: decrease inflammation and decrease pain to improve the patients ability to perform ADLs   Min Type Additional Details    [] Estim:  []Unatt       []IFC  []Premod                        []Other:  []w/ice   []w/heat  Position:  Location:    [] Estim: []Att    []TENS instruct  []NMES                    []Other:  []w/US   []w/ice   []w/heat  Position:  Location:    []  Traction: [] Cervical       []Lumbar                       [] Prone          []Supine                       []Intermittent   []Continuous Lbs:  [] before manual  [] after manual    []  Ultrasound: []Continuous   [] Pulsed                           []1MHz   []3MHz W/cm2:  Location:    []  Iontophoresis with dexamethasone         Location: [] Take home patch   [] In clinic   10 [x]  Ice     []  heat  []  Ice massage  []  Laser   []  Anodyne Position: supine with wedge  Location: left knee    []  Laser with stim  []  Other:  Position:  Location:    []  Vasopneumatic Device    []  Right     []  Left  Pre-treatment girth:  Post-treatment girth:  Measured at (location):  Pressure:       [] lo [] med [] hi   Temperature: [] lo [] med [] hi   [] Skin assessment post-treatment:  []intact []redness- no adverse reaction    []redness - adverse reaction:       21 min Therapeutic Exercise:  [] See flow sheet :   Rationale: increase ROM and increase strength to improve the patients ability to perform daily tasks      8 min Neuromuscular Re-education:  []  See flow sheet :   Rationale: increase strength and increase proprioception  to improve the patients ability to perform functional tasks with improved efficiency    With   [] TE   [] TA   [] neuro   [] other: Patient Education: [x] Review HEP    [] Progressed/Changed HEP based on:   [] positioning   [] body mechanics   [] transfers   [] heat/ice application    [] other:      Other Objective/Functional Measures: updated HEP for continued proression     Pain Level (0-10 scale) post treatment: 0    Summary of Care:  Goals for this certification period to be accomplished in 4 weeks:  1. Pt will demonstrate left knee AROM 0-120 deg for improved ease of transfers. PN: progressing 0-115 deg   Current: Met 125 deg 6/21/2022  2. Pt will demonstrate 15 SLR without quad lag or pain to improve quad strength in standing bilaterally. PN: progressing 12 reps without lag   Current: near met 1 rest break briefly to achieve 15 reps 6/28/2022  3.  Pt will negotiate 4 stairs x 3 with single hand rail void of compensation or instability to improve home negotiation ease. PN: able to perform without gross instability but does report compensating at home due to weakness on left   Current: Met no compensation or instability, no increased discomfort 6/30/2022  4. Pt will improve FOTO score to predicted outcome to demonstrate improved quality of life and function. PN: progressed AD 75  Current: progressed to 47 7/5/2022    ASSESSMENT/Changes in Function: The pt has progressed well regarding knee ROM and overall activity participation. She still reports some left knee discomfort mostly with wearing her heels or prolonged WB'ing activity. She is ready for D/C to HEP at this time to continue management. Pt has obtained multiple exercise equipment to continue replication of PT exercises.      Thank you for this referral!     PLAN  [x]Discontinue therapy: [x]Patient has reached or is progressing toward set goals      []Patient is non-compliant or has abdicated      []Due to lack of appreciable progress towards set 70 Andrew Herrera DPRENITA CMTPT 7/7/2022  10:37 AM

## 2022-07-07 NOTE — PROGRESS NOTES
Physical Therapy Discharge Instructions      [facility]  [address]  [place], [address]  [phone] fax    Patient: Eusebia Zuluaga  : 1950      Continue Home Exercise Program 1 times per day for 4-6 weeks, then decrease to 4 times per week      Continue with    [x] Ice  as needed 1 times per day     [] Heat           Follow up with MD:     [] Upon completion of therapy     [x] As needed      Recommendations:     [x]   Return to activity with home program    []   Return to activity with the following modifications:       []Post Rehab Program    []Join Independent aquatic program     []Return to/join local gym        Additional Comments: Continue with home exercise program and utilizing pain control alternatives as currently undertaking          Nghia Acuña DPT CMTPT 2022 3:53 PM

## 2022-07-12 ENCOUNTER — APPOINTMENT (OUTPATIENT)
Dept: PHYSICAL THERAPY | Age: 72
End: 2022-07-12
Payer: MEDICARE

## 2025-02-24 ENCOUNTER — TELEPHONE (OUTPATIENT)
Facility: HOSPITAL | Age: 75
End: 2025-02-24

## 2025-02-24 NOTE — TELEPHONE ENCOUNTER
Pt called to determine if we received referral from U MD. Informed pt we have not received it via fax or internal referral. Provided fax number and told her we will call upon receipt.

## 2025-02-28 ENCOUNTER — TELEPHONE (OUTPATIENT)
Facility: HOSPITAL | Age: 75
End: 2025-02-28

## 2025-03-07 ENCOUNTER — HOSPITAL ENCOUNTER (OUTPATIENT)
Facility: HOSPITAL | Age: 75
Setting detail: RECURRING SERIES
Discharge: HOME | End: 2025-03-10
Payer: MEDICARE

## 2025-03-07 PROCEDURE — 97112 NEUROMUSCULAR REEDUCATION: CPT

## 2025-03-07 PROCEDURE — 97110 THERAPEUTIC EXERCISES: CPT

## 2025-03-07 PROCEDURE — 97162 PT EVAL MOD COMPLEX 30 MIN: CPT

## 2025-03-07 NOTE — PROGRESS NOTES
[facility]  [address] [facility] [address] [place] [address] Ph:[phone] Fx: [phone]    PLAN OF CARE/ Statement of Necessity for Physical Therapy Services           Patient name: Kassy Mccullough Start of Care: 3/7/2025   Referral source: Bella Barker APRN - NP : 1950    Medical Diagnosis: Right foot pain [M79.671]  Pain in left foot [M79.672]       Onset Date: 2024   Treatment Diagnosis: R26.89  Abnormalities of gait and mobility                                     Prior Hospitalization: see medical history Provider#: [id number]     Comorbidities: Endometrial Cancer, Chemotherapy, Thyroid problems    Prior Level of Function: Improved ease of walking and balance prior to onset.    The Plan of Care and following information is based on the information from the initial evaluation.    Assessment / key information:  The patient is a 74 year old female that was diagnosed with endometrial cancer necessitating a hysterectomy last summer (). The patient underwent 6 treatments of chemotherapy, with her last being 2024. She states following her last treatment, she began to develop neuropathy of her feet with her right being worse than her left. She states her balance has become more difficult as well. She has Oncology  follow-ups with CTs every 6 months, with her next being in July, per patient report. The patient has impairments consisting of pain, decreased ROM, decreased flexibility, decreased strength, decreased ease of ADLs, and decreased balance. The patient will benefit from skilled PT in order to address the aforementioned impairments.    Evaluation Complexity:  History:  HIGH Complexity :3+ comorbidities / personal factors will impact the outcome/ POC ; Examination:  MEDIUM Complexity : 3 Standardized tests and measures addressin body structure, function, activity limitation and / or participation in recreation  ;Presentation:  MEDIUM

## 2025-03-07 NOTE — PROGRESS NOTES
PHYSICAL / OCCUPATIONAL THERAPY - DAILY TREATMENT NOTE     Patient Name: Kassy Mccullough    Date: 3/7/2025    : 1950  Insurance: Payor: MEDICARE / Plan: MEDICARE PART A AND B / Product Type: *No Product type* /      Patient  verified Yes     Visit #   Current / Total 1 24   Time   In / Out 12:30 1:10   Pain   In / Out 0/10 0/10   Subjective Functional Status/Changes: The patient has a chief complaint of foot numbness and effects on balance and ease of gait.   Changes to:  Allergies, Med Hx, Sx Hx?   no       TREATMENT AREA =  Right foot pain [M79.671]  Pain in left foot [M79.672]    If an interpreting service is utilized for treatment of this patient, the contents of this document represent the material reviewed with the patient via the .     OBJECTIVE  Therapeutic Procedures:  Tx Min Billable or 1:1 Min (if diff from Tx Min) Procedure, Rationale, Specifics   15  56264 Therapeutic Exercise (timed):  increase ROM, strength, coordination, balance, and proprioception to improve patient's ability to progress to PLOF and address remaining functional goals. (see flow sheet as applicable)    Details if applicable:       8  16284 Neuromuscular Re-Education (timed):  improve balance, coordination, kinesthetic sense, posture, core stability and proprioception to improve patient's ability to develop conscious control of individual muscles and awareness of position of extremities in order to progress to PLOF and address remaining functional goals. (see flow sheet as applicable)    Details if applicable:  tandem, tandem EC, SLS.   23  [facility] Totals Reminder: bill using total billable min of TIMED therapeutic procedures (example: do not include dry needle or estim unattended, both untimed codes, in totals to left)  8-22 min = 1 unit; 23-37 min = 2 units; 38-52 min = 3 units; 53-67 min = 4 units; 68-82 min = 5 units   Total Total     TOTAL TREATMENT TIME:        40     [x]  Patient Education billed

## 2025-03-11 ENCOUNTER — HOSPITAL ENCOUNTER (OUTPATIENT)
Facility: HOSPITAL | Age: 75
Setting detail: RECURRING SERIES
Discharge: HOME | End: 2025-03-14
Payer: MEDICARE

## 2025-03-11 PROCEDURE — 97110 THERAPEUTIC EXERCISES: CPT

## 2025-03-11 PROCEDURE — 97530 THERAPEUTIC ACTIVITIES: CPT

## 2025-03-11 PROCEDURE — 97112 NEUROMUSCULAR REEDUCATION: CPT

## 2025-03-11 NOTE — PROGRESS NOTES
strength of ankles to 5/5 MMT to maximize stability on uneven surfaces.              IE: 4/5 MMT EV IV  The patient will demonstrate SLS for 7\" without LOB in order to improve stability during gait.              IE: unable  The patient will tandem stance EC for 10\" in order to improve stability when ambulating in a dimly lit room.              IE: unable       PLAN  Yes  Continue plan of care  []  Upgrade activities as tolerated  []  Discharge due to :  []  Other:    Abel Alvarez PT    3/11/2025    1:12 PM    Future Appointments   Date Time Provider Department Center   3/13/2025  9:50 AM Abel Alvarez, PT [facility]   3/18/2025 11:10 AM Mishel Banda, PT [facility]   3/20/2025  3:10 PM Sana, Willam, PTA [facility]   3/25/2025  5:10 PM SanaYamelry, PTA [facility]   3/28/2025  3:10 PM Sana Willam, PTA [facility]   4/1/2025  5:10 PM Sana, Willam, PTA [facility]   4/3/2025  4:30 PM SanaYamelry, PTA [facility]   4/7/2025  5:10 PM Sana, Willam, PTA [facility]   4/9/2025  5:50 PM Abel Alvarez, PT [facility]   4/14/2025  5:10 PM SanaYamelry, PTA [facility]   4/16/2025  5:10 PM Abel Alvarez S, PT [facility]   4/21/2025  5:10 PM Sana, Willam, PTA [facility]   4/23/2025  5:10 PM Abel Alvarez S, PT [facility]   4/28/2025  5:10 PM AlvarezAbel servin S, PT [facility]

## 2025-03-13 ENCOUNTER — APPOINTMENT (OUTPATIENT)
Facility: HOSPITAL | Age: 75
End: 2025-03-13
Payer: MEDICARE

## 2025-03-17 ENCOUNTER — TELEPHONE (OUTPATIENT)
Facility: HOSPITAL | Age: 75
End: 2025-03-17

## 2025-03-17 NOTE — TELEPHONE ENCOUNTER
Pt called stating she missed her 3:50p appt due to traffic. Asked to be placed on the wait list for any upcoming appts this week.

## 2025-03-17 NOTE — TELEPHONE ENCOUNTER
Pt called stating she is going back to work and neds to reschedule appts to accomodate schedule. Appt RS for today at 3:50p.

## 2025-03-18 ENCOUNTER — HOSPITAL ENCOUNTER (OUTPATIENT)
Facility: HOSPITAL | Age: 75
Setting detail: RECURRING SERIES
End: 2025-03-18
Payer: MEDICARE

## 2025-03-19 ENCOUNTER — HOSPITAL ENCOUNTER (OUTPATIENT)
Facility: HOSPITAL | Age: 75
Setting detail: RECURRING SERIES
Discharge: HOME | End: 2025-03-22
Payer: MEDICARE

## 2025-03-19 PROCEDURE — 97112 NEUROMUSCULAR REEDUCATION: CPT

## 2025-03-19 PROCEDURE — 97110 THERAPEUTIC EXERCISES: CPT

## 2025-03-19 PROCEDURE — 97530 THERAPEUTIC ACTIVITIES: CPT

## 2025-03-19 NOTE — PROGRESS NOTES
during gait.              IE: unable  The patient will tandem stance EC for 10\" in order to improve stability when ambulating in a dimly lit room.              IE: unable    PLAN  Yes  Continue plan of care  []  Upgrade activities as tolerated  []  Discharge due to :  []  Other:    Willam Hood PTA    3/19/2025    7:09 AM    Future Appointments   Date Time Provider Department Center   3/19/2025  7:10 AM Willam Hood, PTA [facility]   3/25/2025  5:10 PM Willam Hood, PTA [facility]   3/28/2025  3:10 PM Willam Hood, PTA [facility]   4/1/2025  5:10 PM Willam Hood, PTA [facility]   4/3/2025  5:10 PM Julio Gaxiola, PTA [facility]   4/7/2025  5:10 PM Willam Hood, PTA [facility]   4/9/2025  5:50 PM Abel Alvarez, PT [facility]   4/14/2025  5:10 PM Willam Hood, PTA [facility]   4/16/2025  5:10 PM Abel Alvarez, PT [facility]   4/21/2025  5:10 PM Willam Hood, PTA [facility]   4/23/2025  5:10 PM Abel Alvarez, PT [facility]   4/28/2025  5:10 PM Abel Alvarez, PT [facility]

## 2025-03-20 ENCOUNTER — APPOINTMENT (OUTPATIENT)
Facility: HOSPITAL | Age: 75
End: 2025-03-20
Payer: MEDICARE

## 2025-03-25 ENCOUNTER — HOSPITAL ENCOUNTER (OUTPATIENT)
Facility: HOSPITAL | Age: 75
Setting detail: RECURRING SERIES
Discharge: HOME | End: 2025-03-28
Payer: MEDICARE

## 2025-03-25 ENCOUNTER — TELEPHONE (OUTPATIENT)
Facility: HOSPITAL | Age: 75
End: 2025-03-25

## 2025-03-25 PROCEDURE — 97110 THERAPEUTIC EXERCISES: CPT

## 2025-03-25 PROCEDURE — 97530 THERAPEUTIC ACTIVITIES: CPT

## 2025-03-25 PROCEDURE — 97112 NEUROMUSCULAR REEDUCATION: CPT

## 2025-03-25 NOTE — PROGRESS NOTES
procedures (example: do not include dry needle or estim unattended, both untimed codes, in totals to left)  8-22 min = 1 unit; 23-37 min = 2 units; 38-52 min = 3 units; 53-67 min = 4 units; 68-82 min = 5 units   Total Total     TOTAL TREATMENT TIME:        39     Charge Capture    [x]  Patient Education billed concurrently with other procedures   [x] Review HEP    [] Progressed/Changed HEP, detail:    [] Other detail:       Objective Information/Functional Measures/Assessment    SLS Left 2 seconds, Right 2 seconds. Pt subjectively reports neuropathy symptoms are improving. Demonstrates improved tandem balance and able to perform SLS for 2\". Mild (B) hip fatigue with dynamic gait and lateral band-walks. Pt puts forth good effort and is motivated. Reports she returns to work tomorrow.    Patient will continue to benefit from skilled PT / OT services to modify and progress therapeutic interventions, analyze and address functional mobility deficits, analyze and address ROM deficits, analyze and address strength deficits, analyze and address soft tissue restrictions, analyze and cue for proper movement patterns, and analyze and address imbalance/dizziness to address functional deficits and attain remaining goals.    Progress toward goals / Updated goals:  []  See Progress Note/Recertification    Short Term Goals: To be accomplished in 2 weeks  The patient will demonstrate independence and compliance with HEP to maximize therapeutic benefit.              IE: issued HEP              Current: The patient reports compliance 3/11/2025  The patient demonstrate gastroc flexibility to 10 degrees to improve ease of ambulation.              IE: 0 degrees              Current 3/19/2025: Gastroc PROM flexibility Left 15, Right 12 degrees.      Long Term Goals: To be accomplished in 12 treatments  The patient will improve LEFS score to 60/80 in order to improve ease of ambulation efficiency.              IE: 44/80  The patient will

## 2025-03-26 ENCOUNTER — APPOINTMENT (OUTPATIENT)
Facility: HOSPITAL | Age: 75
End: 2025-03-26
Payer: MEDICARE

## 2025-03-26 ENCOUNTER — TELEPHONE (OUTPATIENT)
Facility: HOSPITAL | Age: 75
End: 2025-03-26

## 2025-03-28 ENCOUNTER — APPOINTMENT (OUTPATIENT)
Facility: HOSPITAL | Age: 75
End: 2025-03-28
Payer: MEDICARE

## 2025-04-01 ENCOUNTER — HOSPITAL ENCOUNTER (OUTPATIENT)
Facility: HOSPITAL | Age: 75
Setting detail: RECURRING SERIES
Discharge: HOME | End: 2025-04-04
Payer: MEDICARE

## 2025-04-01 PROCEDURE — 97112 NEUROMUSCULAR REEDUCATION: CPT

## 2025-04-01 PROCEDURE — 97530 THERAPEUTIC ACTIVITIES: CPT

## 2025-04-01 PROCEDURE — 97110 THERAPEUTIC EXERCISES: CPT

## 2025-04-01 NOTE — PROGRESS NOTES
PHYSICAL / OCCUPATIONAL THERAPY - DAILY TREATMENT NOTE     Patient Name: Kassy Mccullough    Date: 2025    : 1950  Insurance: Payor: MEDICARE / Plan: MEDICARE PART A AND B / Product Type: *No Product type* /      Patient  verified Yes     Visit #   Current / Total 5 24   Time   In / Out 5:16 5:51   Pain   In / Out 0/10 0/10   Subjective Functional Status/Changes: Reports feeling tired, denied pain, mild Right foot tingling.   Changes to:  Allergies, Med Hx, Sx Hx?   no       TREATMENT AREA =  Other abnormalities of gait and mobility [R26.89]    If an interpreting service is utilized for treatment of this patient, the contents of this document represent the material reviewed with the patient via the .     OBJECTIVE    Therapeutic Procedures:  Tx Min Billable or 1:1 Min (if diff from Tx Min) Procedure, Rationale, Specifics   17  47928 Therapeutic Exercise (timed):  increase ROM, strength, coordination, balance, and proprioception to improve patient's ability to progress to PLOF and address remaining functional goals. (see flow sheet as applicable)    Details if applicable:       10  81039 Neuromuscular Re-Education (timed):  improve balance, coordination, kinesthetic sense, posture, core stability and proprioception to improve patient's ability to develop conscious control of individual muscles and awareness of position of extremities in order to progress to PLOF and address remaining functional goals. (see flow sheet as applicable)    Details if applicable:     8  60143 Therapeutic Activity (timed):  use of dynamic activities replicating functional movements to increase ROM, strength, coordination, balance, and proprioception in order to improve patient's ability to progress to PLOF and address remaining functional goals.  (see flow sheet as applicable)     Details if applicable:     35  [facility] Totals Reminder: bill using total billable min of TIMED therapeutic procedures (example: do not

## 2025-04-07 ENCOUNTER — HOSPITAL ENCOUNTER (OUTPATIENT)
Facility: HOSPITAL | Age: 75
Setting detail: RECURRING SERIES
Discharge: HOME | End: 2025-04-10
Payer: MEDICARE

## 2025-04-07 PROCEDURE — 97112 NEUROMUSCULAR REEDUCATION: CPT

## 2025-04-07 PROCEDURE — 97110 THERAPEUTIC EXERCISES: CPT

## 2025-04-07 NOTE — PROGRESS NOTES
EC for 10\" in order to improve stability when ambulating in a dimly lit room.              IE: unable              PN 4/07/2025: Tandem balance on level surface EC for 4\" leading right Right, 20\" leading with Left.    Summary of Care/ Key Functional Changes:     Strength  Inversion: 5/5 MMT  Eversion: 4+/5 MMT     LEFS: 65/80     The patient is progressing concerning strength, ROM and LEFS score noting improvement to 65.80 from 44/80. She will continue to benefit from PT in order to progress her strength and balance. SLS remains limited as does EC. She will continue to benefit from PT to address remaining impairments.       ASSESSMENT/RECOMMENDATIONS:   Patient would benefit from the continuation of skilled rehab interventions for functional progress to achieving above stated clinically significant goals. Continue per plan of care.         Thank you for this referral.   Abel Alvarez, PT 4/7/2025 2:12 PM   
flexibility Left 15, Right 12 degrees.      Long Term Goals: To be accomplished in 12 treatments  The patient will improve LEFS score to 60/80 in order to improve ease of ambulation efficiency.              IE : 44/80   PN: 4/07/2025  The patient will improve INV and EV strength of ankles to 5/5 MMT to maximize stability on uneven surfaces.              IE: 4/5 MMT EV IV   PN 4/07/2025: Progressed to eversion 4+/5 MMT B, 5/5 MMT IV   The patient will demonstrate SLS for 7\" without LOB in order to improve stability during gait.              IE: unable              PN 4/07/2025: (B) 2 seconds.  The patient will tandem stance EC for 10\" in order to improve stability when ambulating in a dimly lit room.              IE: unable              PN 4/07/2025: Tandem balance on level surface EC for 4\" leading right Right, 20\" leading with Left.    PLAN  Yes  Continue plan of care  []  Upgrade activities as tolerated  []  Discharge due to :  []  Other:    Abel Alvarez PT    4/7/2025    12:08 PM    Future Appointments   Date Time Provider Department Center   4/9/2025  5:50 PM Abel Alvarez PT [facility]   4/14/2025  5:10 PM Willam Hood PTA [facility]   4/16/2025  5:10 PM Abel Alvarez PT [facility]   4/21/2025  5:10 PM Willam Hood PTA [facility]   4/23/2025  5:10 PM Abel Alvarez PT [facility]   4/28/2025  5:10 PM Abel Alvarez PT [facility]

## 2025-04-09 ENCOUNTER — HOSPITAL ENCOUNTER (OUTPATIENT)
Facility: HOSPITAL | Age: 75
Setting detail: RECURRING SERIES
Discharge: HOME | End: 2025-04-12
Payer: MEDICARE

## 2025-04-09 PROCEDURE — 97110 THERAPEUTIC EXERCISES: CPT

## 2025-04-09 PROCEDURE — 97112 NEUROMUSCULAR REEDUCATION: CPT

## 2025-04-09 NOTE — PROGRESS NOTES
PHYSICAL / OCCUPATIONAL THERAPY - DAILY TREATMENT NOTE     Patient Name: Kassy Mccullough    Date: 2025    : 1950  Insurance: Payor: MEDICARE / Plan: MEDICARE PART A AND B / Product Type: *No Product type* /      Patient  verified Yes     Visit #   Current / Total 7 24   Time   In / Out 5:50 6:28   Pain   In / Out 0/10 0/10   Subjective Functional Status/Changes: The patient states she feels her numbness and tingling is 60-65% improved since onset.   Changes to:  Allergies, Med Hx, Sx Hx?   no       TREATMENT AREA =  Other abnormalities of gait and mobility [R26.89]    If an interpreting service is utilized for treatment of this patient, the contents of this document represent the material reviewed with the patient via the .     OBJECTIVE  Therapeutic Procedures:  Tx Min Billable or 1:1 Min (if diff from Tx Min) Procedure, Rationale, Specifics   26  07491 Therapeutic Exercise (timed):  increase ROM, strength, coordination, balance, and proprioception to improve patient's ability to progress to PLOF and address remaining functional goals. (see flow sheet as applicable)    Details if applicable:       12  20561 Neuromuscular Re-Education (timed):  improve balance, coordination, kinesthetic sense, posture, core stability and proprioception to improve patient's ability to develop conscious control of individual muscles and awareness of position of extremities in order to progress to PLOF and address remaining functional goals. (see flow sheet as applicable)    Details if applicable:     38  [facility] Totals Reminder: bill using total billable min of TIMED therapeutic procedures (example: do not include dry needle or estim unattended, both untimed codes, in totals to left)  8-22 min = 1 unit; 23-37 min = 2 units; 38-52 min = 3 units; 53-67 min = 4 units; 68-82 min = 5 units   Total Total     TOTAL TREATMENT TIME:        38     Charge Capture    [x]  Patient Education billed concurrently with other

## 2025-04-14 ENCOUNTER — APPOINTMENT (OUTPATIENT)
Facility: HOSPITAL | Age: 75
End: 2025-04-14
Payer: MEDICARE

## 2025-04-14 ENCOUNTER — TELEPHONE (OUTPATIENT)
Facility: HOSPITAL | Age: 75
End: 2025-04-14

## 2025-04-15 ENCOUNTER — APPOINTMENT (OUTPATIENT)
Facility: HOSPITAL | Age: 75
End: 2025-04-15
Payer: MEDICARE

## 2025-04-16 ENCOUNTER — TELEPHONE (OUTPATIENT)
Facility: HOSPITAL | Age: 75
End: 2025-04-16

## 2025-04-16 ENCOUNTER — HOSPITAL ENCOUNTER (OUTPATIENT)
Facility: HOSPITAL | Age: 75
Setting detail: RECURRING SERIES
Discharge: HOME | End: 2025-04-19
Payer: MEDICARE

## 2025-04-16 PROCEDURE — 97530 THERAPEUTIC ACTIVITIES: CPT

## 2025-04-16 PROCEDURE — 97112 NEUROMUSCULAR REEDUCATION: CPT

## 2025-04-16 PROCEDURE — 97110 THERAPEUTIC EXERCISES: CPT

## 2025-04-16 NOTE — PROGRESS NOTES
PHYSICAL / OCCUPATIONAL THERAPY - DAILY TREATMENT NOTE     Patient Name: Kassy Mccullough    Date: 2025    : 1950  Insurance: Payor: MEDICARE / Plan: MEDICARE PART A AND B / Product Type: *No Product type* /      Patient  verified Yes     Visit #   Current / Total 8 24   Time   In / Out 3:54 4:41   Pain   In / Out 0/10 0/10   Subjective Functional Status/Changes: Pt reports they had a little numbness this morning.   Changes to:  Allergies, Med Hx, Sx Hx?   no       TREATMENT AREA =  Other abnormalities of gait and mobility [R26.89]    If an interpreting service is utilized for treatment of this patient, the contents of this document represent the material reviewed with the patient via the .     OBJECTIVE      Therapeutic Procedures:  Tx Min Billable or 1:1 Min (if diff from Tx Min) Procedure, Rationale, Specifics   20  06915 Therapeutic Exercise (timed):  increase ROM, strength, coordination, balance, and proprioception to improve patient's ability to progress to PLOF and address remaining functional goals. (see flow sheet as applicable)    Details if applicable:       17  18973 Neuromuscular Re-Education (timed):  improve balance, coordination, kinesthetic sense, posture, core stability and proprioception to improve patient's ability to develop conscious control of individual muscles and awareness of position of extremities in order to progress to PLOF and address remaining functional goals. (see flow sheet as applicable)    Details if applicable:     10  61190 Therapeutic Activity (timed):  use of dynamic activities replicating functional movements to increase ROM, strength, coordination, balance, and proprioception in order to improve patient's ability to progress to PLOF and address remaining functional goals.  (see flow sheet as applicable)     Details if applicable:     47  [facility] Totals Reminder: bill using total billable min of TIMED therapeutic procedures (example: do not include

## 2025-04-21 ENCOUNTER — HOSPITAL ENCOUNTER (OUTPATIENT)
Facility: HOSPITAL | Age: 75
Setting detail: RECURRING SERIES
Discharge: HOME | End: 2025-04-24
Payer: MEDICARE

## 2025-04-21 PROCEDURE — 97110 THERAPEUTIC EXERCISES: CPT

## 2025-04-21 PROCEDURE — 97112 NEUROMUSCULAR REEDUCATION: CPT

## 2025-04-21 NOTE — PROGRESS NOTES
PHYSICAL / OCCUPATIONAL THERAPY - DAILY TREATMENT NOTE     Patient Name: Kassy Mccullough    Date: 2025    : 1950  Insurance: Payor: MEDICARE / Plan: MEDICARE PART A AND B / Product Type: *No Product type* /      Patient  verified Yes     Visit #   Current / Total 9 24   Time   In / Out 5:20 5:50   Pain   In / Out 0/10 0/10   Subjective Functional Status/Changes: Pt several minutes late to appointment. Reports feeling exhausted and almost cancelled.   Changes to:  Allergies, Med Hx, Sx Hx?   no       TREATMENT AREA =  Other abnormalities of gait and mobility [R26.89]    If an interpreting service is utilized for treatment of this patient, the contents of this document represent the material reviewed with the patient via the .     OBJECTIVE    Therapeutic Procedures:  Tx Min Billable or 1:1 Min (if diff from Tx Min) Procedure, Rationale, Specifics   10  89801 Therapeutic Exercise (timed):  increase ROM, strength, coordination, balance, and proprioception to improve patient's ability to progress to PLOF and address remaining functional goals. (see flow sheet as applicable)    Details if applicable:       10  22754 Neuromuscular Re-Education (timed):  improve balance, coordination, kinesthetic sense, posture, core stability and proprioception to improve patient's ability to develop conscious control of individual muscles and awareness of position of extremities in order to progress to PLOF and address remaining functional goals. (see flow sheet as applicable)    Details if applicable:     10  92114 Therapeutic Activity (timed):  use of dynamic activities replicating functional movements to increase ROM, strength, coordination, balance, and proprioception in order to improve patient's ability to progress to PLOF and address remaining functional goals.  (see flow sheet as applicable)     Details if applicable:     30  [facility] BC Totals Reminder: bill using total billable min of TIMED therapeutic

## 2025-04-23 ENCOUNTER — HOSPITAL ENCOUNTER (OUTPATIENT)
Facility: HOSPITAL | Age: 75
Setting detail: RECURRING SERIES
End: 2025-04-23
Payer: MEDICARE

## 2025-04-23 ENCOUNTER — TELEPHONE (OUTPATIENT)
Facility: HOSPITAL | Age: 75
End: 2025-04-23

## 2025-04-28 ENCOUNTER — TELEPHONE (OUTPATIENT)
Facility: HOSPITAL | Age: 75
End: 2025-04-28

## 2025-04-28 ENCOUNTER — APPOINTMENT (OUTPATIENT)
Facility: HOSPITAL | Age: 75
End: 2025-04-28
Payer: MEDICARE
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
## 2025-04-30 ENCOUNTER — HOSPITAL ENCOUNTER (EMERGENCY)
Facility: HOSPITAL | Age: 75
Discharge: HOME | End: 2025-04-30
Attending: EMERGENCY MEDICINE
Payer: MEDICARE

## 2025-04-30 ENCOUNTER — APPOINTMENT (OUTPATIENT)
Facility: HOSPITAL | Age: 75
End: 2025-04-30
Payer: MEDICARE

## 2025-04-30 VITALS
RESPIRATION RATE: 18 BRPM | OXYGEN SATURATION: 100 % | HEART RATE: 59 BPM | WEIGHT: 207.01 LBS | DIASTOLIC BLOOD PRESSURE: 60 MMHG | TEMPERATURE: 98.1 F | SYSTOLIC BLOOD PRESSURE: 100 MMHG
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
## 2025-04-30 PROCEDURE — 73562 X-RAY EXAM OF KNEE 3: CPT

## 2025-04-30 PROCEDURE — 6370000000 HC RX 637 (ALT 250 FOR IP): Performed by: STUDENT IN AN ORGANIZED HEALTH CARE EDUCATION/TRAINING PROGRAM

## 2025-04-30 PROCEDURE — 73030 X-RAY EXAM OF SHOULDER: CPT

## 2025-04-30 PROCEDURE — 73130 X-RAY EXAM OF HAND: CPT

## 2025-04-30 PROCEDURE — 99283 EMERGENCY DEPT VISIT LOW MDM: CPT

## 2025-04-30 RX ORDER — ACETAMINOPHEN 500 MG
1000 TABLET ORAL
Status: COMPLETED | OUTPATIENT
Start: 2025-04-30 | End: 2025-04-30

## 2025-04-30 RX ADMIN — ACETAMINOPHEN 1000 MG: 500 TABLET ORAL at 19:06

## 2025-04-30 ASSESSMENT — ENCOUNTER SYMPTOMS
COUGH: 0
EYE PAIN: 0
ABDOMINAL PAIN: 0
SHORTNESS OF BREATH: 0
SORE THROAT: 0
DIARRHEA: 0
VOMITING: 0
NAUSEA: 0

## 2025-04-30 ASSESSMENT — PAIN - FUNCTIONAL ASSESSMENT
PAIN_FUNCTIONAL_ASSESSMENT: 0-10
PAIN_FUNCTIONAL_ASSESSMENT: PREVENTS OR INTERFERES SOME ACTIVE ACTIVITIES AND ADLS
PAIN_FUNCTIONAL_ASSESSMENT: ACTIVITIES ARE NOT PREVENTED

## 2025-04-30 ASSESSMENT — PAIN DESCRIPTION - LOCATION
LOCATION: KNEE;HAND
LOCATION: LEG

## 2025-04-30 ASSESSMENT — PAIN DESCRIPTION - ONSET: ONSET: ON-GOING

## 2025-04-30 ASSESSMENT — PAIN DESCRIPTION - PAIN TYPE: TYPE: ACUTE PAIN

## 2025-04-30 ASSESSMENT — PAIN DESCRIPTION - ORIENTATION
ORIENTATION: RIGHT
ORIENTATION: RIGHT;LEFT

## 2025-04-30 ASSESSMENT — PAIN SCALES - GENERAL
PAINLEVEL_OUTOF10: 8
PAINLEVEL_OUTOF10: 7

## 2025-04-30 ASSESSMENT — PAIN DESCRIPTION - DESCRIPTORS: DESCRIPTORS: ACHING

## 2025-04-30 ASSESSMENT — PAIN DESCRIPTION - FREQUENCY: FREQUENCY: INTERMITTENT
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
## 2025-04-30 NOTE — DISCHARGE INSTRUCTIONS
Thank you for allowing us to provide you with medical care today.  We realize that you have many choices for your emergency care needs.  We thank you for choosing [facility].  Please choose us in the future for any continued health care needs.     The exam and treatment you received in the Emergency Department were for an emergent problem and are not intended as complete care. It is important that you follow up with a doctor, nurse practitioner, or physician assistant for ongoing care. If your symptoms worsen or you do not improve as expected and you are unable to reach your usual health care provider, you should return to the Emergency Department. We are available 24 hours a day.     Please make an appointment with your healthcare provider(s) for follow up of your Emergency Department visit.  Take this sheet with you when you go to your follow-up visit.    Continue to monitor symptoms at home.  Take motrin or tylenol as needed.  Follow-up with PCP and return with any changes or worsening.
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
## 2025-05-05 ENCOUNTER — TELEPHONE (OUTPATIENT)
Facility: HOSPITAL | Age: 75
End: 2025-05-05
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
## 2025-05-07 ENCOUNTER — TELEPHONE (OUTPATIENT)
Facility: HOSPITAL | Age: 75
End: 2025-05-07

## 2025-05-12 ENCOUNTER — TELEPHONE (OUTPATIENT)
Facility: HOSPITAL | Age: 75
End: 2025-05-12
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
